# Patient Record
Sex: FEMALE | Race: WHITE | NOT HISPANIC OR LATINO | Employment: OTHER | RURAL
[De-identification: names, ages, dates, MRNs, and addresses within clinical notes are randomized per-mention and may not be internally consistent; named-entity substitution may affect disease eponyms.]

---

## 2021-01-12 ENCOUNTER — LAB (OUTPATIENT)
Dept: LAB | Facility: HOSPITAL | Age: 61
End: 2021-01-12

## 2021-01-12 ENCOUNTER — OFFICE VISIT (OUTPATIENT)
Dept: FAMILY MEDICINE CLINIC | Facility: CLINIC | Age: 61
End: 2021-01-12

## 2021-01-12 VITALS
WEIGHT: 90.9 LBS | HEART RATE: 95 BPM | HEIGHT: 66 IN | SYSTOLIC BLOOD PRESSURE: 184 MMHG | RESPIRATION RATE: 20 BRPM | BODY MASS INDEX: 14.61 KG/M2 | DIASTOLIC BLOOD PRESSURE: 100 MMHG | OXYGEN SATURATION: 98 %

## 2021-01-12 DIAGNOSIS — R64 CACHEXIA (HCC): ICD-10-CM

## 2021-01-12 DIAGNOSIS — I10 HYPERTENSION, UNSPECIFIED TYPE: Primary | ICD-10-CM

## 2021-01-12 DIAGNOSIS — G44.019 EPISODIC CLUSTER HEADACHE, NOT INTRACTABLE: ICD-10-CM

## 2021-01-12 DIAGNOSIS — G89.4 CHRONIC PAIN SYNDROME: ICD-10-CM

## 2021-01-12 PROCEDURE — 93005 ELECTROCARDIOGRAM TRACING: CPT | Performed by: STUDENT IN AN ORGANIZED HEALTH CARE EDUCATION/TRAINING PROGRAM

## 2021-01-12 PROCEDURE — 85007 BL SMEAR W/DIFF WBC COUNT: CPT | Performed by: STUDENT IN AN ORGANIZED HEALTH CARE EDUCATION/TRAINING PROGRAM

## 2021-01-12 PROCEDURE — 80053 COMPREHEN METABOLIC PANEL: CPT | Performed by: STUDENT IN AN ORGANIZED HEALTH CARE EDUCATION/TRAINING PROGRAM

## 2021-01-12 PROCEDURE — 93010 ELECTROCARDIOGRAM REPORT: CPT | Performed by: INTERNAL MEDICINE

## 2021-01-12 PROCEDURE — 84100 ASSAY OF PHOSPHORUS: CPT | Performed by: STUDENT IN AN ORGANIZED HEALTH CARE EDUCATION/TRAINING PROGRAM

## 2021-01-12 PROCEDURE — 81015 MICROSCOPIC EXAM OF URINE: CPT | Performed by: STUDENT IN AN ORGANIZED HEALTH CARE EDUCATION/TRAINING PROGRAM

## 2021-01-12 PROCEDURE — 83735 ASSAY OF MAGNESIUM: CPT | Performed by: STUDENT IN AN ORGANIZED HEALTH CARE EDUCATION/TRAINING PROGRAM

## 2021-01-12 PROCEDURE — 85025 COMPLETE CBC W/AUTO DIFF WBC: CPT | Performed by: STUDENT IN AN ORGANIZED HEALTH CARE EDUCATION/TRAINING PROGRAM

## 2021-01-12 PROCEDURE — 99204 OFFICE O/P NEW MOD 45 MIN: CPT | Performed by: STUDENT IN AN ORGANIZED HEALTH CARE EDUCATION/TRAINING PROGRAM

## 2021-01-12 RX ORDER — CYCLOBENZAPRINE HCL 10 MG
10 TABLET ORAL 3 TIMES DAILY PRN
COMMUNITY
End: 2022-07-19

## 2021-01-12 RX ORDER — MORPHINE SULFATE 30 MG/1
30 TABLET ORAL EVERY 6 HOURS PRN
COMMUNITY
End: 2021-02-11 | Stop reason: SDUPTHER

## 2021-01-12 RX ORDER — OXYCODONE AND ACETAMINOPHEN 10; 325 MG/1; MG/1
1 TABLET ORAL EVERY 6 HOURS PRN
COMMUNITY
End: 2021-02-11 | Stop reason: SDUPTHER
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
## 2021-01-13 LAB
ALBUMIN SERPL-MCNC: 4.7 G/DL (ref 3.5–5.2)
ALBUMIN/GLOB SERPL: 2.2 G/DL
ALP SERPL-CCNC: 52 U/L (ref 39–117)
ALT SERPL W P-5'-P-CCNC: 14 U/L (ref 1–33)
ANION GAP SERPL CALCULATED.3IONS-SCNC: 11.2 MMOL/L (ref 5–15)
ANISOCYTOSIS BLD QL: ABNORMAL
AST SERPL-CCNC: 21 U/L (ref 1–32)
BACTERIA UR QL AUTO: ABNORMAL /HPF
BASOPHILS # BLD MANUAL: 0.15 10*3/MM3 (ref 0–0.2)
BASOPHILS NFR BLD AUTO: 2 % (ref 0–1.5)
BILIRUB SERPL-MCNC: 0.3 MG/DL (ref 0–1.2)
BUN SERPL-MCNC: 10 MG/DL (ref 8–23)
BUN/CREAT SERPL: 20.4 (ref 7–25)
CALCIUM SPEC-SCNC: 8.8 MG/DL (ref 8.6–10.5)
CHLORIDE SERPL-SCNC: 98 MMOL/L (ref 98–107)
CO2 SERPL-SCNC: 28.8 MMOL/L (ref 22–29)
CREAT SERPL-MCNC: 0.49 MG/DL (ref 0.57–1)
DEPRECATED RDW RBC AUTO: 38.2 FL (ref 37–54)
EOSINOPHIL # BLD MANUAL: 0.08 10*3/MM3 (ref 0–0.4)
EOSINOPHIL NFR BLD MANUAL: 1 % (ref 0.3–6.2)
ERYTHROCYTE [DISTWIDTH] IN BLOOD BY AUTOMATED COUNT: 12.3 % (ref 12.3–15.4)
GFR SERPL CREATININE-BSD FRML MDRD: 128 ML/MIN/1.73
GLOBULIN UR ELPH-MCNC: 2.1 GM/DL
GLUCOSE SERPL-MCNC: 81 MG/DL (ref 65–99)
HCT VFR BLD AUTO: 40.4 % (ref 34–46.6)
HGB BLD-MCNC: 13.9 G/DL (ref 12–15.9)
HYALINE CASTS UR QL AUTO: ABNORMAL /LPF
LYMPHOCYTES # BLD MANUAL: 3.76 10*3/MM3 (ref 0.7–3.1)
LYMPHOCYTES NFR BLD MANUAL: 4 % (ref 5–12)
LYMPHOCYTES NFR BLD MANUAL: 50 % (ref 19.6–45.3)
MAGNESIUM SERPL-MCNC: 2 MG/DL (ref 1.6–2.4)
MCH RBC QN AUTO: 30.1 PG (ref 26.6–33)
MCHC RBC AUTO-ENTMCNC: 34.4 G/DL (ref 31.5–35.7)
MCV RBC AUTO: 87.4 FL (ref 79–97)
MICROCYTES BLD QL: ABNORMAL
MONOCYTES # BLD AUTO: 0.3 10*3/MM3 (ref 0.1–0.9)
NEUTROPHILS # BLD AUTO: 3.23 10*3/MM3 (ref 1.7–7)
NEUTROPHILS NFR BLD MANUAL: 43 % (ref 42.7–76)
PHOSPHATE SERPL-MCNC: 3.5 MG/DL (ref 2.5–4.5)
PLAT MORPH BLD: NORMAL
PLATELET # BLD AUTO: 272 10*3/MM3 (ref 140–450)
PMV BLD AUTO: 10.1 FL (ref 6–12)
POTASSIUM SERPL-SCNC: 3.2 MMOL/L (ref 3.5–5.2)
PROT SERPL-MCNC: 6.8 G/DL (ref 6–8.5)
RBC # BLD AUTO: 4.62 10*6/MM3 (ref 3.77–5.28)
RBC # UR: ABNORMAL /HPF
REF LAB TEST METHOD: ABNORMAL
SODIUM SERPL-SCNC: 138 MMOL/L (ref 136–145)
SQUAMOUS #/AREA URNS HPF: ABNORMAL /HPF
WBC # BLD AUTO: 7.51 10*3/MM3 (ref 3.4–10.8)
WBC MORPH BLD: NORMAL
WBC UR QL AUTO: ABNORMAL /HPF

## 2021-01-15 LAB
QT INTERVAL: 366 MS
QTC INTERVAL: 459 MS

## 2021-01-20 ENCOUNTER — TELEPHONE (OUTPATIENT)
Dept: FAMILY MEDICINE CLINIC | Facility: CLINIC | Age: 61
End: 2021-01-20

## 2021-01-20 NOTE — TELEPHONE ENCOUNTER
----- Message from Pk Barton MD sent at 1/20/2021 10:13 AM CST -----  Overall, labs are reassuring.  Nothing emergent/urgent.  Most notable abnormality was blood in the urine, we will address this at follow-up appointment. However if you start having symptoms such as burning when going to the bathroom, abdominal pain, difficulty urinating, flank pain, please inform us or go to ED.

## 2021-01-25 ENCOUNTER — TELEPHONE (OUTPATIENT)
Dept: FAMILY MEDICINE CLINIC | Facility: CLINIC | Age: 61
End: 2021-01-25

## 2021-01-25 NOTE — TELEPHONE ENCOUNTER
Left a message to remind of appointment patient needs to be here at least fifteen minuets early./hub to read

## 2021-01-27 ENCOUNTER — TELEPHONE (OUTPATIENT)
Dept: FAMILY MEDICINE CLINIC | Facility: CLINIC | Age: 61
End: 2021-01-27

## 2021-01-27 NOTE — TELEPHONE ENCOUNTER
I called Brianna Miller about referral to nutrition services because [facility] is not taking new Pt's at this time, asked if [place] would be ok? she said no that's to far. she then asked about her Morphine. She said she called the Pain mangagement center and it's going to be march before she can get in, she said she can't wait that long and wanted to know who can she go to that will give her Morphine, if she doesn't have it she will die.... notified Dr. Barton of conversation. Advised Pt I would follow up with Dr. Barton and give her a call back with info. Pt is willing to drive to [place] if there is a doctor there that will refill her Morphine Rx while waiting to get in with Pain Management.

## 2021-01-27 NOTE — TELEPHONE ENCOUNTER
Patient called you back with some more questions about the referral. Please give her a call back.

## 2021-02-08 ENCOUNTER — TELEPHONE (OUTPATIENT)
Dept: FAMILY MEDICINE CLINIC | Facility: CLINIC | Age: 61
End: 2021-02-08

## 2021-02-09 ENCOUNTER — OFFICE VISIT (OUTPATIENT)
Dept: FAMILY MEDICINE CLINIC | Facility: CLINIC | Age: 61
End: 2021-02-09

## 2021-02-09 ENCOUNTER — LAB (OUTPATIENT)
Dept: LAB | Facility: HOSPITAL | Age: 61
End: 2021-02-09

## 2021-02-09 VITALS
OXYGEN SATURATION: 97 % | BODY MASS INDEX: 15.75 KG/M2 | WEIGHT: 98 LBS | HEIGHT: 66 IN | DIASTOLIC BLOOD PRESSURE: 96 MMHG | HEART RATE: 106 BPM | SYSTOLIC BLOOD PRESSURE: 156 MMHG

## 2021-02-09 DIAGNOSIS — G89.4 CHRONIC PAIN SYNDROME: Primary | ICD-10-CM

## 2021-02-09 DIAGNOSIS — R31.21 ASYMPTOMATIC MICROSCOPIC HEMATURIA: ICD-10-CM

## 2021-02-09 DIAGNOSIS — F11.20 OPIATE DEPENDENCE, CONTINUOUS (HCC): ICD-10-CM

## 2021-02-09 PROBLEM — M25.569 KNEE PAIN: Status: ACTIVE | Noted: 2018-08-15

## 2021-02-09 PROBLEM — G43.909 MIGRAINE: Status: ACTIVE | Noted: 2017-07-19

## 2021-02-09 PROBLEM — M54.2 NECK PAIN: Status: ACTIVE | Noted: 2017-09-14

## 2021-02-09 PROBLEM — M79.641 PAIN IN RIGHT HAND: Status: ACTIVE | Noted: 2017-07-19

## 2021-02-09 PROBLEM — M79.10 MUSCLE PAIN: Status: ACTIVE | Noted: 2017-09-14

## 2021-02-09 PROBLEM — K44.9 HIATAL HERNIA: Status: ACTIVE | Noted: 2018-08-15

## 2021-02-09 PROBLEM — M54.50 LOW BACK PAIN: Status: ACTIVE | Noted: 2020-05-18

## 2021-02-09 PROBLEM — K27.9 PEPTIC ULCER: Status: ACTIVE | Noted: 2018-07-18

## 2021-02-09 PROBLEM — M54.12 CERVICAL RADICULOPATHY: Status: ACTIVE | Noted: 2020-05-18

## 2021-02-09 PROBLEM — M76.00 GLUTEAL TENDINITIS: Status: ACTIVE | Noted: 2017-07-19

## 2021-02-09 LAB
AMPHET+METHAMPHET UR QL: NEGATIVE
AMPHETAMINES UR QL: NEGATIVE
BARBITURATES UR QL SCN: NEGATIVE
BENZODIAZ UR QL SCN: NEGATIVE
BUPRENORPHINE SERPL-MCNC: NEGATIVE NG/ML
CANNABINOIDS SERPL QL: NEGATIVE
COCAINE UR QL: NEGATIVE
METHADONE UR QL SCN: NEGATIVE
OPIATES UR QL: POSITIVE
OXYCODONE UR QL SCN: POSITIVE
PCP UR QL SCN: NEGATIVE
PROPOXYPH UR QL: NEGATIVE
TRICYCLICS UR QL SCN: NEGATIVE

## 2021-02-09 PROCEDURE — 81015 MICROSCOPIC EXAM OF URINE: CPT | Performed by: STUDENT IN AN ORGANIZED HEALTH CARE EDUCATION/TRAINING PROGRAM

## 2021-02-09 PROCEDURE — 81003 URINALYSIS AUTO W/O SCOPE: CPT | Performed by: STUDENT IN AN ORGANIZED HEALTH CARE EDUCATION/TRAINING PROGRAM

## 2021-02-09 PROCEDURE — 80306 DRUG TEST PRSMV INSTRMNT: CPT | Performed by: STUDENT IN AN ORGANIZED HEALTH CARE EDUCATION/TRAINING PROGRAM

## 2021-02-09 PROCEDURE — 99214 OFFICE O/P EST MOD 30 MIN: CPT | Performed by: STUDENT IN AN ORGANIZED HEALTH CARE EDUCATION/TRAINING PROGRAM

## 2021-02-09 RX ORDER — OMEPRAZOLE 40 MG/1
CAPSULE, DELAYED RELEASE ORAL EVERY 12 HOURS SCHEDULED
COMMUNITY
End: 2022-05-06 | Stop reason: SDUPTHER

## 2021-02-09 RX ORDER — OXYCODONE HYDROCHLORIDE 10 MG/1
TABLET ORAL EVERY 6 HOURS SCHEDULED
COMMUNITY
End: 2021-02-11 | Stop reason: SDUPTHER

## 2021-02-09 NOTE — PROGRESS NOTES
"   Subjective:  Brianna Miller is a 60 y.o. female who presents for 2 week follow up    Chronic pain; states is taking Morphine 30mg x2 in morning, 30mg at night. Additionally, taking oxycodone 10mg QID as needed for breakthrough pain. Has appointment with pain management 2/19/21, but will run out of medication on the 2/12/21. She is very concerned because she does not want to withdraw. Previously seen by pain management in [place].      HTN; states not checking at home.  Blood pressure today was 156/96, states she is very anxious and in pain and feels like this is why her blood pressure is elevated.    Vitals:    Vitals:    02/09/21 1311   BP: 156/96   Pulse: 106   SpO2: 97%   Weight: 44.5 kg (98 lb)   Height: 167.6 cm (66\")     Body mass index is 15.82 kg/m².    Current Outpatient Medications:   •  cyclobenzaprine (FLEXERIL) 10 MG tablet, Take 10 mg by mouth 3 (Three) Times a Day As Needed for Muscle Spasms., Disp: , Rfl:   •  Morphine (MSIR) 30 MG tablet, Take 1 tablet by mouth Every 8 (Eight) Hours As Needed for Moderate Pain  or Severe Pain  for up to 8 days. Takes 2 tablets in the AM and 1 at night, Disp: 24 tablet, Rfl: 0  •  omeprazole (priLOSEC) 40 MG capsule, Every 12 (Twelve) Hours., Disp: , Rfl:   •  oxyCODONE (ROXICODONE) 10 MG tablet, Take 1 tablet by mouth Every 6 (Six) Hours As Needed for Moderate Pain  or Severe Pain  for up to 8 days., Disp: 32 tablet, Rfl: 0    Review of Systems  Review of Systems   Constitutional: Negative for appetite change and fever.   HENT: Negative for sore throat.    Eyes: Negative for discharge and visual disturbance.   Respiratory: Negative for cough and shortness of breath.    Cardiovascular: Negative for chest pain, palpitations and leg swelling.   Gastrointestinal: Negative for abdominal pain, diarrhea and vomiting.   Genitourinary: Negative for dysuria.   Musculoskeletal: Positive for arthralgias, back pain and myalgias.   Skin: Negative for rash.   Neurological: " Negative for light-headedness and headaches.   Psychiatric/Behavioral: Negative for agitation.       Patient Active Problem List   Diagnosis   • Cervical radiculopathy   • Gluteal tendinitis   • Hiatal hernia   • Knee pain   • Low back pain   • Migraine   • Muscle pain   • Neck pain   • Pain in right hand   • Peptic ulcer   • Anxiety state   • Fracture of hand     Past Surgical History:   Procedure Laterality Date   • BACK SURGERY     • CYST REMOVAL     • TONSILLECTOMY       Social History     Socioeconomic History   • Marital status:      Spouse name: Not on file   • Number of children: Not on file   • Years of education: Not on file   • Highest education level: Not on file   Tobacco Use   • Smoking status: Current Every Day Smoker     Packs/day: 0.25     Types: Cigarettes   • Smokeless tobacco: Never Used   Substance and Sexual Activity   • Alcohol use: Never     Frequency: Never   • Drug use: Never     History reviewed. No pertinent family history.  Office Visit on 01/12/2021   Component Date Value Ref Range Status   • QT Interval 01/12/2021 366  ms Final   • QTC Interval 01/12/2021 459  ms Final   • Glucose 01/12/2021 81  65 - 99 mg/dL Final   • BUN 01/12/2021 10  8 - 23 mg/dL Final   • Creatinine 01/12/2021 0.49* 0.57 - 1.00 mg/dL Final   • Sodium 01/12/2021 138  136 - 145 mmol/L Final   • Potassium 01/12/2021 3.2* 3.5 - 5.2 mmol/L Final   • Chloride 01/12/2021 98  98 - 107 mmol/L Final   • CO2 01/12/2021 28.8  22.0 - 29.0 mmol/L Final   • Calcium 01/12/2021 8.8  8.6 - 10.5 mg/dL Final   • Total Protein 01/12/2021 6.8  6.0 - 8.5 g/dL Final   • Albumin 01/12/2021 4.70  3.50 - 5.20 g/dL Final   • ALT (SGPT) 01/12/2021 14  1 - 33 U/L Final   • AST (SGOT) 01/12/2021 21  1 - 32 U/L Final   • Alkaline Phosphatase 01/12/2021 52  39 - 117 U/L Final   • Total Bilirubin 01/12/2021 0.3  0.0 - 1.2 mg/dL Final   • eGFR Non African Amer 01/12/2021 128  >60 mL/min/1.73 Final   • Globulin 01/12/2021 2.1  gm/dL Final    • A/G Ratio 01/12/2021 2.2  g/dL Final   • BUN/Creatinine Ratio 01/12/2021 20.4  7.0 - 25.0 Final   • Anion Gap 01/12/2021 11.2  5.0 - 15.0 mmol/L Final   • Magnesium 01/12/2021 2.0  1.6 - 2.4 mg/dL Final   • Phosphorus 01/12/2021 3.5  2.5 - 4.5 mg/dL Final   • WBC 01/12/2021 7.51  3.40 - 10.80 10*3/mm3 Final   • RBC 01/12/2021 4.62  3.77 - 5.28 10*6/mm3 Final   • Hemoglobin 01/12/2021 13.9  12.0 - 15.9 g/dL Final   • Hematocrit 01/12/2021 40.4  34.0 - 46.6 % Final   • MCV 01/12/2021 87.4  79.0 - 97.0 fL Final   • MCH 01/12/2021 30.1  26.6 - 33.0 pg Final   • MCHC 01/12/2021 34.4  31.5 - 35.7 g/dL Final   • RDW 01/12/2021 12.3  12.3 - 15.4 % Final   • RDW-SD 01/12/2021 38.2  37.0 - 54.0 fl Final   • MPV 01/12/2021 10.1  6.0 - 12.0 fL Final   • Platelets 01/12/2021 272  140 - 450 10*3/mm3 Final   • RBC, UA 01/12/2021 3-5* None Seen, 0-2 /HPF Final   • WBC, UA 01/12/2021 0-2  None Seen, 0-2 /HPF Final   • Bacteria, UA 01/12/2021 None Seen  None Seen /HPF Final   • Squamous Epithelial Cells, UA 01/12/2021 0-2  None Seen, 0-2 /HPF Final   • Hyaline Casts, UA 01/12/2021 0-2  None Seen /LPF Final   • Methodology 01/12/2021 Automated Microscopy   Final   • Neutrophil % 01/12/2021 43.0  42.7 - 76.0 % Final   • Lymphocyte % 01/12/2021 50.0* 19.6 - 45.3 % Final   • Monocyte % 01/12/2021 4.0* 5.0 - 12.0 % Final   • Eosinophil % 01/12/2021 1.0  0.3 - 6.2 % Final   • Basophil % 01/12/2021 2.0* 0.0 - 1.5 % Final   • Neutrophils Absolute 01/12/2021 3.23  1.70 - 7.00 10*3/mm3 Final   • Lymphocytes Absolute 01/12/2021 3.76* 0.70 - 3.10 10*3/mm3 Final   • Monocytes Absolute 01/12/2021 0.30  0.10 - 0.90 10*3/mm3 Final   • Eosinophils Absolute 01/12/2021 0.08  0.00 - 0.40 10*3/mm3 Final   • Basophils Absolute 01/12/2021 0.15  0.00 - 0.20 10*3/mm3 Final   • Anisocytosis 01/12/2021 Slight/1+  None Seen Final   • Microcytes 01/12/2021 Slight/1+  None Seen Final   • WBC Morphology 01/12/2021 Normal  Normal Final   • Platelet Morphology  01/12/2021 Normal  Normal Final      No image results found.    [unfilled]    There is no immunization history on file for this patient.    The following portions of the patient's history were reviewed and updated as appropriate: allergies, current medications, past family history, past medical history, past social history, past surgical history and problem list.      Physical Exam  Physical Exam  Constitutional:       Appearance: Normal appearance.   HENT:      Head: Normocephalic and atraumatic.      Right Ear: Tympanic membrane and ear canal normal.      Left Ear: Tympanic membrane and ear canal normal.   Eyes:      General:         Right eye: No discharge.         Left eye: No discharge.      Conjunctiva/sclera: Conjunctivae normal.   Neck:      Musculoskeletal: Normal range of motion.   Cardiovascular:      Rate and Rhythm: Normal rate and regular rhythm.      Pulses: Normal pulses.      Heart sounds: Normal heart sounds. No murmur.   Pulmonary:      Effort: Pulmonary effort is normal. No respiratory distress.      Breath sounds: Normal breath sounds.   Abdominal:      General: There is no distension.      Palpations: Abdomen is soft.      Tenderness: There is no abdominal tenderness.   Lymphadenopathy:      Cervical: No cervical adenopathy.   Neurological:      Mental Status: She is alert. Mental status is at baseline.   Psychiatric:         Mood and Affect: Mood normal.         Behavior: Behavior normal.         Assessment/Plan    Diagnosis Plan   1. Chronic pain syndrome  Urine Drug Screen - Urine, Clean Catch    Urinalysis without microscopic (no culture) - Urine, Clean Catch   2. Asymptomatic microscopic hematuria  Urinalysis With Microscopic - Urine, Clean Catch    Urinalysis, Microscopic Only - Urine, Clean Catch   3. Opiate dependence, continuous (CMS/[facility])        Orders Placed This Encounter   Procedures   • Urine Drug Screen - Urine, Clean Catch   • Urinalysis, Microscopic Only - Urine, Clean Catch    • Urinalysis without microscopic (no culture) - Urine, Clean Catch   • Urinalysis With Microscopic - Urine, Clean Catch     Microscopic hematuria; asymptomatic, repeat UA today reassuring.  Will obtain urine drug screen, prescribed medications until able to get into pain management for 8 days.     Blood pressure; states that she is asymptomatic, does not want to change her blood pressure regimen, will keep a blood pressure log and follow-up in 1 month.     Cachexia; BMI has increased from prior visit, patient states that she is always been thin, discussed dangers of being underweight, and possible life-threatening implications, patient without bradycardia or hypotension, reassuring. Denied food avoidance or body dysmorphia.       This document has been electronically signed by Pk Barton MD on February 19, 2021 21:58 CST

## 2021-02-10 LAB
BACTERIA UR QL AUTO: ABNORMAL /HPF
BILIRUB UR QL STRIP: NEGATIVE
CLARITY UR: CLEAR
COLOR UR: YELLOW
GLUCOSE UR STRIP-MCNC: NEGATIVE MG/DL
HGB UR QL STRIP.AUTO: NEGATIVE
HYALINE CASTS UR QL AUTO: ABNORMAL /LPF
KETONES UR QL STRIP: NEGATIVE
LEUKOCYTE ESTERASE UR QL STRIP.AUTO: NEGATIVE
NITRITE UR QL STRIP: NEGATIVE
PH UR STRIP.AUTO: 7 [PH] (ref 5–8)
PROT UR QL STRIP: NEGATIVE
RBC # UR: ABNORMAL /HPF
REF LAB TEST METHOD: ABNORMAL
SP GR UR STRIP: 1.01 (ref 1–1.03)
SQUAMOUS #/AREA URNS HPF: ABNORMAL /HPF
UROBILINOGEN UR QL STRIP: NORMAL
WBC UR QL AUTO: ABNORMAL /HPF

## 2021-02-11 RX ORDER — OXYCODONE HYDROCHLORIDE 10 MG/1
10 TABLET ORAL EVERY 6 HOURS PRN
Qty: 32 TABLET | Refills: 0 | Status: SHIPPED | OUTPATIENT
Start: 2021-02-11 | End: 2021-02-19 | Stop reason: SDUPTHER

## 2021-02-11 RX ORDER — MORPHINE SULFATE 30 MG/1
30 TABLET ORAL EVERY 8 HOURS PRN
Qty: 24 TABLET | Refills: 0 | Status: SHIPPED | OUTPATIENT
Start: 2021-02-11 | End: 2021-02-19 | Stop reason: SDUPTHER

## 2021-02-12 ENCOUNTER — TELEPHONE (OUTPATIENT)
Dept: FAMILY MEDICINE CLINIC | Facility: CLINIC | Age: 61
End: 2021-02-12

## 2021-02-12 DIAGNOSIS — F11.20 OPIATE DEPENDENCE, CONTINUOUS: ICD-10-CM

## 2021-02-12 RX ORDER — MORPHINE SULFATE 30 MG/1
30 TABLET ORAL EVERY 8 HOURS PRN
Qty: 24 TABLET | Refills: 0 | Status: CANCELLED | OUTPATIENT
Start: 2021-02-12 | End: 2021-02-20

## 2021-02-12 NOTE — TELEPHONE ENCOUNTER
.Caller: Brianna Miller    Relationship: Self    Best call back number: [phone]    Medication needed:   Requested Prescriptions     Pending Prescriptions Disp Refills   • Morphine (MSIR) 30 MG tablet 24 tablet 0     Sig: Take 1 tablet by mouth Every 8 (Eight) Hours As Needed for Moderate Pain  or Severe Pain  for up to 8 days. Takes 2 tablets in the AM and 1 at night       When do you need the refill by: 02/12/21    What details did the patient provide when requesting the medication: PATIENT IS NEEDING A REFILL. PLEASE CALL AND ADVISE.     Does the patient have less than a 3 day supply:  [x] Yes  [] No    What is the patient's preferred pharmacy: [facility] #[id number] [address] [facility] AT SEC OF [address] & [facility] - [phone] [facility] [phone] FX

## 2021-02-18 ENCOUNTER — TELEPHONE (OUTPATIENT)
Dept: FAMILY MEDICINE CLINIC | Facility: CLINIC | Age: 61
End: 2021-02-18

## 2021-02-18 NOTE — TELEPHONE ENCOUNTER
Caller: Brianna Miller    Relationship: Self    Best call back number: [phone]    What medication are you requesting: PAIN MEDICATION     Have you had these symptoms before:    [x] Yes  [] No    Have you been treated for these symptoms before:   [x] Yes  [] No    If a prescription is needed, what is your preferred pharmacy and phone number: [facility] #[id number] [place], [place] - [address] AT SEC [address] [facility] [address] - [phone]  - [phone]   [phone]       PATIENT CALLED IN STATING THAT THE PAIN CLINIC CANCELED HER APPT. PATIENT STATES THAT SHE WILL RUN OUT OF HER MEDICATION BEFORE SHE CAN GET INTO THE PAIN CLINIC AGAIN.  PATIENT IS WANTING TO KNOW IF  WOULD CALL IN A WEEKS WORTH OR SO OF MEDICATION, ENOUGH TO GET HER TO HER APPT. PLEASE ADVISE.

## 2021-02-19 DIAGNOSIS — F11.20 OPIATE DEPENDENCE, CONTINUOUS (HCC): ICD-10-CM

## 2021-02-19 PROBLEM — S62.90XA FRACTURE OF HAND: Status: ACTIVE | Noted: 2021-02-19

## 2021-02-19 PROBLEM — F41.1 ANXIETY STATE: Status: ACTIVE | Noted: 2021-02-19

## 2021-02-19 RX ORDER — MORPHINE SULFATE 30 MG/1
30 TABLET ORAL EVERY 8 HOURS PRN
Qty: 24 TABLET | Refills: 0 | Status: SHIPPED | OUTPATIENT
Start: 2021-02-19 | End: 2021-02-26 | Stop reason: SDUPTHER

## 2021-02-19 RX ORDER — OXYCODONE HYDROCHLORIDE 10 MG/1
10 TABLET ORAL EVERY 6 HOURS PRN
Qty: 32 TABLET | Refills: 0 | Status: SHIPPED | OUTPATIENT
Start: 2021-02-19 | End: 2021-02-26 | Stop reason: SDUPTHER

## 2021-02-25 ENCOUNTER — TELEPHONE (OUTPATIENT)
Dept: FAMILY MEDICINE CLINIC | Facility: CLINIC | Age: 61
End: 2021-02-25

## 2021-02-26 ENCOUNTER — TELEPHONE (OUTPATIENT)
Dept: FAMILY MEDICINE CLINIC | Facility: CLINIC | Age: 61
End: 2021-02-26

## 2021-02-26 DIAGNOSIS — F11.20 OPIATE DEPENDENCE, CONTINUOUS (HCC): ICD-10-CM

## 2021-02-26 RX ORDER — MORPHINE SULFATE 30 MG/1
30 TABLET ORAL EVERY 8 HOURS PRN
Qty: 24 TABLET | Refills: 0 | Status: SHIPPED | OUTPATIENT
Start: 2021-02-27 | End: 2021-03-05 | Stop reason: SDUPTHER

## 2021-02-26 RX ORDER — OXYCODONE HYDROCHLORIDE 10 MG/1
10 TABLET ORAL EVERY 6 HOURS PRN
Qty: 32 TABLET | Refills: 0 | Status: SHIPPED | OUTPATIENT
Start: 2021-02-27 | End: 2021-03-02 | Stop reason: SDUPTHER

## 2021-02-26 NOTE — TELEPHONE ENCOUNTER
Please give this patient a call back. She really wants to know that her pain medication was sent it.

## 2021-03-02 DIAGNOSIS — F11.20 OPIATE DEPENDENCE, CONTINUOUS (HCC): ICD-10-CM

## 2021-03-02 RX ORDER — OXYCODONE HYDROCHLORIDE 10 MG/1
10 TABLET ORAL EVERY 6 HOURS PRN
Qty: 32 TABLET | Refills: 0 | Status: SHIPPED | OUTPATIENT
Start: 2021-03-02 | End: 2021-03-22

## 2021-03-04 ENCOUNTER — TELEPHONE (OUTPATIENT)
Dept: FAMILY MEDICINE CLINIC | Facility: CLINIC | Age: 61
End: 2021-03-04

## 2021-03-04 NOTE — TELEPHONE ENCOUNTER
Ms Paul would like to know if you could refill her morphine until her can get into the pain clinic 3/12/2021 is her appointment date.

## 2021-03-05 DIAGNOSIS — F11.20 OPIATE DEPENDENCE, CONTINUOUS (HCC): ICD-10-CM

## 2021-03-05 RX ORDER — MORPHINE SULFATE 30 MG/1
30 TABLET ORAL EVERY 8 HOURS PRN
Qty: 21 TABLET | Refills: 0 | Status: SHIPPED | OUTPATIENT
Start: 2021-03-05 | End: 2021-03-12

## 2021-04-13 ENCOUNTER — OFFICE VISIT (OUTPATIENT)
Dept: FAMILY MEDICINE CLINIC | Facility: CLINIC | Age: 61
End: 2021-04-13

## 2021-04-13 ENCOUNTER — TELEPHONE (OUTPATIENT)
Dept: FAMILY MEDICINE CLINIC | Facility: CLINIC | Age: 61
End: 2021-04-13

## 2021-04-13 VITALS
OXYGEN SATURATION: 98 % | WEIGHT: 95 LBS | SYSTOLIC BLOOD PRESSURE: 120 MMHG | RESPIRATION RATE: 18 BRPM | HEIGHT: 66 IN | HEART RATE: 83 BPM | DIASTOLIC BLOOD PRESSURE: 62 MMHG | BODY MASS INDEX: 15.27 KG/M2

## 2021-04-13 DIAGNOSIS — K44.9 HIATAL HERNIA: Primary | ICD-10-CM

## 2021-04-13 DIAGNOSIS — R64 CACHEXIA (HCC): ICD-10-CM

## 2021-04-13 PROBLEM — M79.7 FIBROMYALGIA: Status: ACTIVE | Noted: 2021-03-09

## 2021-04-13 PROCEDURE — 99213 OFFICE O/P EST LOW 20 MIN: CPT | Performed by: STUDENT IN AN ORGANIZED HEALTH CARE EDUCATION/TRAINING PROGRAM

## 2021-04-13 RX ORDER — SUCRALFATE ORAL 1 G/10ML
1 SUSPENSION ORAL
Qty: 840 ML | Refills: 5 | Status: SHIPPED | OUTPATIENT
Start: 2021-04-13 | End: 2021-04-20

## 2021-04-13 RX ORDER — MORPHINE SULFATE 30 MG/1
30 TABLET, FILM COATED, EXTENDED RELEASE ORAL 2 TIMES DAILY
COMMUNITY
Start: 2021-03-16

## 2021-04-13 NOTE — PROGRESS NOTES
"   Subjective:  Brianna Miller is a 60 y.o. female who presents for ulcer    States think she has an ulcer;  Had a stomach ulcer approximately 30 years ago.  Has been taking her Prilosec and pepto every day.  Has had increased epigastric pain, medication does help, however would like to see gastroenterology.  Recent labs in January reassuring, no hematochezia, hematemesis, night sweats, unintentional weight loss.  Patient currently working on increasing nutrition, weight has increased slightly since last visit.      Patient Active Problem List   Diagnosis   • Cervical radiculopathy   • Gluteal tendinitis   • Hiatal hernia   • Knee pain   • Low back pain   • Migraine   • Muscle pain   • Neck pain   • Pain in right hand   • Peptic ulcer   • Anxiety state   • Fracture of hand   • Fibromyalgia     Vitals:    Vitals:    04/13/21 1147   BP: 120/62   Pulse: 83   Resp: 18   SpO2: 98%   Weight: 43.1 kg (95 lb)   Height: 167.6 cm (66\")     Body mass index is 15.33 kg/m².    Current Outpatient Medications:   •  cyclobenzaprine (FLEXERIL) 10 MG tablet, Take 10 mg by mouth 3 (Three) Times a Day As Needed for Muscle Spasms., Disp: , Rfl:   •  Morphine (MS CONTIN) 30 MG 12 hr tablet, Take 30 mg by mouth 2 (Two) Times a Day., Disp: , Rfl:   •  omeprazole (priLOSEC) 40 MG capsule, Every 12 (Twelve) Hours., Disp: , Rfl:   •  sucralfate (CARAFATE) 1 g tablet, TAKE 1 TABLET BY MOUTH FOUR TIMES DAILY, Disp: 360 tablet, Rfl: 0    Review of Systems  Review of Systems   Constitutional: Negative for appetite change and fever.   HENT: Negative for sore throat.    Eyes: Negative for discharge and visual disturbance.   Respiratory: Negative for cough and shortness of breath.    Cardiovascular: Negative for chest pain, palpitations and leg swelling.   Gastrointestinal: Positive for abdominal pain. Negative for diarrhea and vomiting.   Genitourinary: Negative for dysuria.   Skin: Negative for rash.   Neurological: Negative for light-headedness " and headaches.   Psychiatric/Behavioral: Negative for agitation.       Patient Active Problem List   Diagnosis   • Cervical radiculopathy   • Gluteal tendinitis   • Hiatal hernia   • Knee pain   • Low back pain   • Migraine   • Muscle pain   • Neck pain   • Pain in right hand   • Peptic ulcer   • Anxiety state   • Fracture of hand   • Fibromyalgia     Past Surgical History:   Procedure Laterality Date   • BACK SURGERY     • CYST REMOVAL     • TONSILLECTOMY       Social History     Socioeconomic History   • Marital status:      Spouse name: Not on file   • Number of children: Not on file   • Years of education: Not on file   • Highest education level: Not on file   Tobacco Use   • Smoking status: Current Every Day Smoker     Packs/day: 0.25     Types: Cigarettes   • Smokeless tobacco: Never Used   Substance and Sexual Activity   • Alcohol use: Never   • Drug use: Never     History reviewed. No pertinent family history.  Office Visit on 02/09/2021   Component Date Value Ref Range Status   • THC, Screen, Urine 02/09/2021 Negative  Negative Final   • Phencyclidine (PCP), Urine 02/09/2021 Negative  Negative Final   • Cocaine Screen, Urine 02/09/2021 Negative  Negative Final   • Methamphetamine, Ur 02/09/2021 Negative  Negative Final   • Opiate Screen 02/09/2021 Positive* Negative Final   • Amphetamine Screen, Urine 02/09/2021 Negative  Negative Final   • Benzodiazepine Screen, Urine 02/09/2021 Negative  Negative Final   • Tricyclic Antidepressants Screen 02/09/2021 Negative  Negative Final   • Methadone Screen, Urine 02/09/2021 Negative  Negative Final   • Barbiturates Screen, Urine 02/09/2021 Negative  Negative Final   • Oxycodone Screen, Urine 02/09/2021 Positive* Negative Final   • Propoxyphene Screen 02/09/2021 Negative  Negative Final   • Buprenorphine, Screen, Urine 02/09/2021 Negative  Negative Final   • RBC, UA 02/09/2021 0-2  None Seen, 0-2 /HPF Final   • WBC, UA 02/09/2021 0-2  None Seen, 0-2 /HPF Final    • Bacteria, UA 02/09/2021 None Seen  None Seen /HPF Final   • Squamous Epithelial Cells, UA 02/09/2021 3-6* None Seen, 0-2 /HPF Final   • Hyaline Casts, UA 02/09/2021 None Seen  None Seen /LPF Final   • Methodology 02/09/2021 Automated Microscopy   Final   • Color, UA 02/09/2021 Yellow  Yellow, Straw Final   • Appearance, UA 02/09/2021 Clear  Clear Final   • pH, UA 02/09/2021 7.0  5.0 - 8.0 Final   • Specific Gravity, UA 02/09/2021 1.008  1.005 - 1.030 Final   • Glucose, UA 02/09/2021 Negative  Negative Final   • Ketones, UA 02/09/2021 Negative  Negative Final   • Bilirubin, UA 02/09/2021 Negative  Negative Final   • Blood, UA 02/09/2021 Negative  Negative Final   • Protein, UA 02/09/2021 Negative  Negative Final   • Leuk Esterase, UA 02/09/2021 Negative  Negative Final   • Nitrite, UA 02/09/2021 Negative  Negative Final   • Urobilinogen, UA 02/09/2021 0.2 E.U./dL  0.2 - 1.0 E.U./dL Final   Office Visit on 01/12/2021   Component Date Value Ref Range Status   • QT Interval 01/12/2021 366  ms Final   • QTC Interval 01/12/2021 459  ms Final   • Glucose 01/12/2021 81  65 - 99 mg/dL Final   • BUN 01/12/2021 10  8 - 23 mg/dL Final   • Creatinine 01/12/2021 0.49* 0.57 - 1.00 mg/dL Final   • Sodium 01/12/2021 138  136 - 145 mmol/L Final   • Potassium 01/12/2021 3.2* 3.5 - 5.2 mmol/L Final   • Chloride 01/12/2021 98  98 - 107 mmol/L Final   • CO2 01/12/2021 28.8  22.0 - 29.0 mmol/L Final   • Calcium 01/12/2021 8.8  8.6 - 10.5 mg/dL Final   • Total Protein 01/12/2021 6.8  6.0 - 8.5 g/dL Final   • Albumin 01/12/2021 4.70  3.50 - 5.20 g/dL Final   • ALT (SGPT) 01/12/2021 14  1 - 33 U/L Final   • AST (SGOT) 01/12/2021 21  1 - 32 U/L Final   • Alkaline Phosphatase 01/12/2021 52  39 - 117 U/L Final   • Total Bilirubin 01/12/2021 0.3  0.0 - 1.2 mg/dL Final   • eGFR Non African Amer 01/12/2021 128  >60 mL/min/1.73 Final   • Globulin 01/12/2021 2.1  gm/dL Final   • A/G Ratio 01/12/2021 2.2  g/dL Final   • BUN/Creatinine Ratio  01/12/2021 20.4  7.0 - 25.0 Final   • Anion Gap 01/12/2021 11.2  5.0 - 15.0 mmol/L Final   • Magnesium 01/12/2021 2.0  1.6 - 2.4 mg/dL Final   • Phosphorus 01/12/2021 3.5  2.5 - 4.5 mg/dL Final   • WBC 01/12/2021 7.51  3.40 - 10.80 10*3/mm3 Final   • RBC 01/12/2021 4.62  3.77 - 5.28 10*6/mm3 Final   • Hemoglobin 01/12/2021 13.9  12.0 - 15.9 g/dL Final   • Hematocrit 01/12/2021 40.4  34.0 - 46.6 % Final   • MCV 01/12/2021 87.4  79.0 - 97.0 fL Final   • MCH 01/12/2021 30.1  26.6 - 33.0 pg Final   • MCHC 01/12/2021 34.4  31.5 - 35.7 g/dL Final   • RDW 01/12/2021 12.3  12.3 - 15.4 % Final   • RDW-SD 01/12/2021 38.2  37.0 - 54.0 fl Final   • MPV 01/12/2021 10.1  6.0 - 12.0 fL Final   • Platelets 01/12/2021 272  140 - 450 10*3/mm3 Final   • RBC, UA 01/12/2021 3-5* None Seen, 0-2 /HPF Final   • WBC, UA 01/12/2021 0-2  None Seen, 0-2 /HPF Final   • Bacteria, UA 01/12/2021 None Seen  None Seen /HPF Final   • Squamous Epithelial Cells, UA 01/12/2021 0-2  None Seen, 0-2 /HPF Final   • Hyaline Casts, UA 01/12/2021 0-2  None Seen /LPF Final   • Methodology 01/12/2021 Automated Microscopy   Final   • Neutrophil % 01/12/2021 43.0  42.7 - 76.0 % Final   • Lymphocyte % 01/12/2021 50.0* 19.6 - 45.3 % Final   • Monocyte % 01/12/2021 4.0* 5.0 - 12.0 % Final   • Eosinophil % 01/12/2021 1.0  0.3 - 6.2 % Final   • Basophil % 01/12/2021 2.0* 0.0 - 1.5 % Final   • Neutrophils Absolute 01/12/2021 3.23  1.70 - 7.00 10*3/mm3 Final   • Lymphocytes Absolute 01/12/2021 3.76* 0.70 - 3.10 10*3/mm3 Final   • Monocytes Absolute 01/12/2021 0.30  0.10 - 0.90 10*3/mm3 Final   • Eosinophils Absolute 01/12/2021 0.08  0.00 - 0.40 10*3/mm3 Final   • Basophils Absolute 01/12/2021 0.15  0.00 - 0.20 10*3/mm3 Final   • Anisocytosis 01/12/2021 Slight/1+  None Seen Final   • Microcytes 01/12/2021 Slight/1+  None Seen Final   • WBC Morphology 01/12/2021 Normal  Normal Final   • Platelet Morphology 01/12/2021 Normal  Normal Final      No image results  found.    @[facility]@    There is no immunization history on file for this patient.    The following portions of the patient's history were reviewed and updated as appropriate: allergies, current medications, past family history, past medical history, past social history, past surgical history and problem list.        Physical Exam  Physical Exam  Constitutional:       Appearance: Normal appearance.   HENT:      Head: Normocephalic and atraumatic.      Right Ear: Tympanic membrane and ear canal normal.      Left Ear: Tympanic membrane and ear canal normal.   Eyes:      General:         Right eye: No discharge.         Left eye: No discharge.      Conjunctiva/sclera: Conjunctivae normal.   Cardiovascular:      Rate and Rhythm: Normal rate and regular rhythm.      Pulses: Normal pulses.      Heart sounds: Normal heart sounds. No murmur heard.     Pulmonary:      Effort: Pulmonary effort is normal. No respiratory distress.      Breath sounds: Normal breath sounds.   Abdominal:      General: There is no distension.      Palpations: Abdomen is soft.      Tenderness: There is no abdominal tenderness.   Musculoskeletal:      Cervical back: Normal range of motion.   Lymphadenopathy:      Cervical: No cervical adenopathy.   Neurological:      Mental Status: She is alert. Mental status is at baseline.   Psychiatric:         Mood and Affect: Mood normal.         Behavior: Behavior normal.         Assessment/Plan    Diagnosis Plan   1. Hiatal hernia  Ambulatory Referral to Gastroenterology   2. Cachexia (CMS/HCC)        Orders Placed This Encounter   Procedures   • Ambulatory Referral to Gastroenterology     Referral Priority:   Routine     Referral Type:   Consultation     Referral Reason:   Specialty Services Required     Requested Specialty:   Gastroenterology     Number of Visits Requested:   1     Patient with history of hiatal hernia;.  Currently on sucralfate, Prilosec, no red flags, recent labs reassuring.  Encourage  patient to continue to increase nutrition, BMI still highly concerning, referred to gastroenterology for further management.        This document has been electronically signed by Pk Barton MD on April 23, 2021 15:01 CDT

## 2021-04-20 DIAGNOSIS — K44.9 HIATAL HERNIA: ICD-10-CM

## 2021-04-20 RX ORDER — SUCRALFATE 1 G/1
1 TABLET ORAL 4 TIMES DAILY
Qty: 120 TABLET | Refills: 1 | Status: SHIPPED | OUTPATIENT
Start: 2021-04-20 | End: 2021-04-21

## 2021-04-21 RX ORDER — SUCRALFATE 1 G/1
TABLET ORAL
Qty: 360 TABLET | Refills: 0 | Status: SHIPPED | OUTPATIENT
Start: 2021-04-21 | End: 2021-08-03 | Stop reason: SDUPTHER

## 2021-05-21 ENCOUNTER — TELEPHONE (OUTPATIENT)
Dept: FAMILY MEDICINE CLINIC | Facility: CLINIC | Age: 61
End: 2021-05-21

## 2021-05-28 NOTE — TELEPHONE ENCOUNTER
"Sent letter out. It came back stating \"return to sender - unable to forward\".  Cancelled referral because we cannot contact patient.  "

## 2021-08-03 ENCOUNTER — OFFICE VISIT (OUTPATIENT)
Dept: FAMILY MEDICINE CLINIC | Facility: CLINIC | Age: 61
End: 2021-08-03

## 2021-08-03 VITALS
HEIGHT: 66 IN | WEIGHT: 91.3 LBS | DIASTOLIC BLOOD PRESSURE: 100 MMHG | BODY MASS INDEX: 14.67 KG/M2 | SYSTOLIC BLOOD PRESSURE: 150 MMHG | OXYGEN SATURATION: 98 % | TEMPERATURE: 97.7 F | HEART RATE: 85 BPM

## 2021-08-03 DIAGNOSIS — J01.00 SUBACUTE MAXILLARY SINUSITIS: ICD-10-CM

## 2021-08-03 DIAGNOSIS — H60.503 ACUTE NONINFECTIVE OTITIS EXTERNA OF BOTH EARS, UNSPECIFIED TYPE: Primary | ICD-10-CM

## 2021-08-03 DIAGNOSIS — K44.9 HIATAL HERNIA: ICD-10-CM

## 2021-08-03 DIAGNOSIS — G44.019 EPISODIC CLUSTER HEADACHE, NOT INTRACTABLE: ICD-10-CM

## 2021-08-03 DIAGNOSIS — G43.009 MIGRAINE WITHOUT AURA AND WITHOUT STATUS MIGRAINOSUS, NOT INTRACTABLE: ICD-10-CM

## 2021-08-03 DIAGNOSIS — H61.22 HEARING LOSS DUE TO CERUMEN IMPACTION, LEFT: ICD-10-CM

## 2021-08-03 PROCEDURE — 99213 OFFICE O/P EST LOW 20 MIN: CPT | Performed by: STUDENT IN AN ORGANIZED HEALTH CARE EDUCATION/TRAINING PROGRAM

## 2021-08-03 RX ORDER — NEOMYCIN SULFATE, POLYMYXIN B SULFATE AND HYDROCORTISONE 10; 3.5; 1 MG/ML; MG/ML; [USP'U]/ML
3 SUSPENSION/ DROPS AURICULAR (OTIC) 4 TIMES DAILY
Qty: 10 ML | Refills: 3 | Status: SHIPPED | OUTPATIENT
Start: 2021-08-03 | End: 2021-08-03

## 2021-08-03 RX ORDER — NEOMYCIN SULFATE, POLYMYXIN B SULFATE AND HYDROCORTISONE 10; 3.5; 1 MG/ML; MG/ML; [USP'U]/ML
3 SUSPENSION/ DROPS AURICULAR (OTIC) 4 TIMES DAILY
Qty: 10 ML | Refills: 3 | Status: SHIPPED | OUTPATIENT
Start: 2021-08-03 | End: 2021-08-19

## 2021-08-03 RX ORDER — RIZATRIPTAN BENZOATE 10 MG/1
10 TABLET ORAL ONCE AS NEEDED
COMMUNITY
End: 2021-08-03 | Stop reason: SDUPTHER

## 2021-08-03 RX ORDER — AMOXICILLIN AND CLAVULANATE POTASSIUM 875; 125 MG/1; MG/1
1 TABLET, FILM COATED ORAL 2 TIMES DAILY
Qty: 14 TABLET | Refills: 0 | Status: SHIPPED | OUTPATIENT
Start: 2021-08-03 | End: 2021-08-03

## 2021-08-03 RX ORDER — SUCRALFATE 1 G/1
1 TABLET ORAL 4 TIMES DAILY
Qty: 360 TABLET | Refills: 1 | Status: SHIPPED | OUTPATIENT
Start: 2021-08-03 | End: 2021-08-03

## 2021-08-03 RX ORDER — AMOXICILLIN AND CLAVULANATE POTASSIUM 875; 125 MG/1; MG/1
1 TABLET, FILM COATED ORAL 2 TIMES DAILY
Qty: 14 TABLET | Refills: 0 | Status: SHIPPED | OUTPATIENT
Start: 2021-08-03 | End: 2021-08-19

## 2021-08-03 RX ORDER — GABAPENTIN 100 MG/1
100 CAPSULE ORAL 3 TIMES DAILY
COMMUNITY
Start: 2021-07-24 | End: 2022-04-25 | Stop reason: DRUGHIGH

## 2021-08-03 RX ORDER — SUCRALFATE 1 G/1
1 TABLET ORAL 4 TIMES DAILY
Qty: 360 TABLET | Refills: 1 | Status: SHIPPED | OUTPATIENT
Start: 2021-08-03 | End: 2022-05-06 | Stop reason: ALTCHOICE

## 2021-08-03 RX ORDER — MULTIPLE VITAMINS W/ MINERALS TAB 9MG-400MCG
1 TAB ORAL DAILY
COMMUNITY
End: 2021-08-19

## 2021-08-03 RX ORDER — RIZATRIPTAN BENZOATE 10 MG/1
10 TABLET ORAL ONCE AS NEEDED
Qty: 10 TABLET | Refills: 1 | Status: SHIPPED | OUTPATIENT
Start: 2021-08-03 | End: 2021-11-30

## 2021-08-03 RX ORDER — RIZATRIPTAN BENZOATE 10 MG/1
10 TABLET ORAL ONCE AS NEEDED
Qty: 10 TABLET | Refills: 1 | Status: SHIPPED | OUTPATIENT
Start: 2021-08-03 | End: 2021-08-03

## 2021-08-03 NOTE — PROGRESS NOTES
"Subjective:  Brianna Miller is a 61 y.o. female who presents for     States has had recurrent ear infections her whole life, has not seen ENT in several years. This recent infection started ~ 3 months ago, went to urgent care twice. First time was given abx and steroid believes it was ciprofloxacin. Went again ~ 2-3 weeks later, was given abx and prednisone. Took half of prednisone because of mood disturbances. Was on it for 3-5 days for both abx. Additionally, has had left sided lymph node swelling, tenderness. No sore throat, no trouble swallowing, breathing, fever chills, nausea, vomiting, diarrhea. No loss of taste or smell, no sick contacts, no discharge from ear, decreased hearing but has popping and fullness sensation in her left ear.     Patient Active Problem List   Diagnosis   • Cervical radiculopathy   • Gluteal tendinitis   • Hiatal hernia   • Knee pain   • Low back pain   • Migraine   • Muscle pain   • Neck pain   • Pain in right hand   • Peptic ulcer   • Anxiety state   • Fracture of hand   • Fibromyalgia     Vitals:    Vitals:    08/03/21 1630 08/03/21 1640   BP: (!) 200/100 150/100   BP Location: Right arm Right arm   Patient Position: Sitting Sitting   Cuff Size: Adult Adult   Pulse: 85    Temp: 97.7 °F (36.5 °C)    SpO2: 98%    Weight: 41.4 kg (91 lb 4.8 oz)    Height: 167.6 cm (66\")      Body mass index is 14.74 kg/m².      Current Outpatient Medications:   •  cyclobenzaprine (FLEXERIL) 10 MG tablet, Take 10 mg by mouth 3 (Three) Times a Day As Needed for Muscle Spasms., Disp: , Rfl:   •  gabapentin (NEURONTIN) 100 MG capsule, Take 100 mg by mouth 3 (Three) Times a Day., Disp: , Rfl:   •  Morphine (MS CONTIN) 30 MG 12 hr tablet, Take 30 mg by mouth 2 (Two) Times a Day., Disp: , Rfl:   •  omeprazole (priLOSEC) 40 MG capsule, Every 12 (Twelve) Hours., Disp: , Rfl:   •  rizatriptan (MAXALT) 10 MG tablet, Take 1 tablet by mouth 1 (One) Time As Needed for Migraine. May repeat in 2 hours if needed, Disp: " 10 tablet, Rfl: 1  •  sucralfate (CARAFATE) 1 g tablet, Take 1 tablet by mouth 4 (Four) Times a Day., Disp: 360 tablet, Rfl: 1    Patient Active Problem List   Diagnosis   • Cervical radiculopathy   • Gluteal tendinitis   • Hiatal hernia   • Knee pain   • Low back pain   • Migraine   • Muscle pain   • Neck pain   • Pain in right hand   • Peptic ulcer   • Anxiety state   • Fracture of hand   • Fibromyalgia     Past Surgical History:   Procedure Laterality Date   • BACK SURGERY     • CYST REMOVAL     • TONSILLECTOMY       Social History     Socioeconomic History   • Marital status:      Spouse name: Not on file   • Number of children: Not on file   • Years of education: Not on file   • Highest education level: Not on file   Tobacco Use   • Smoking status: Current Every Day Smoker     Packs/day: 0.25     Types: Cigarettes   • Smokeless tobacco: Never Used   Substance and Sexual Activity   • Alcohol use: Never   • Drug use: Never   • Sexual activity: Defer     History reviewed. No pertinent family history.  Office Visit on 02/09/2021   Component Date Value Ref Range Status   • THC, Screen, Urine 02/09/2021 Negative  Negative Final   • Phencyclidine (PCP), Urine 02/09/2021 Negative  Negative Final   • Cocaine Screen, Urine 02/09/2021 Negative  Negative Final   • Methamphetamine, Ur 02/09/2021 Negative  Negative Final   • Opiate Screen 02/09/2021 Positive* Negative Final   • Amphetamine Screen, Urine 02/09/2021 Negative  Negative Final   • Benzodiazepine Screen, Urine 02/09/2021 Negative  Negative Final   • Tricyclic Antidepressants Screen 02/09/2021 Negative  Negative Final   • Methadone Screen, Urine 02/09/2021 Negative  Negative Final   • Barbiturates Screen, Urine 02/09/2021 Negative  Negative Final   • Oxycodone Screen, Urine 02/09/2021 Positive* Negative Final   • Propoxyphene Screen 02/09/2021 Negative  Negative Final   • Buprenorphine, Screen, Urine 02/09/2021 Negative  Negative Final   • RBC, UA  02/09/2021 0-2  None Seen, 0-2 /HPF Final   • WBC, UA 02/09/2021 0-2  None Seen, 0-2 /HPF Final   • Bacteria, UA 02/09/2021 None Seen  None Seen /HPF Final   • Squamous Epithelial Cells, UA 02/09/2021 3-6* None Seen, 0-2 /HPF Final   • Hyaline Casts, UA 02/09/2021 None Seen  None Seen /LPF Final   • Methodology 02/09/2021 Automated Microscopy   Final   • Color, UA 02/09/2021 Yellow  Yellow, Straw Final   • Appearance, UA 02/09/2021 Clear  Clear Final   • pH, UA 02/09/2021 7.0  5.0 - 8.0 Final   • Specific Gravity, UA 02/09/2021 1.008  1.005 - 1.030 Final   • Glucose, UA 02/09/2021 Negative  Negative Final   • Ketones, UA 02/09/2021 Negative  Negative Final   • Bilirubin, UA 02/09/2021 Negative  Negative Final   • Blood, UA 02/09/2021 Negative  Negative Final   • Protein, UA 02/09/2021 Negative  Negative Final   • Leuk Esterase, UA 02/09/2021 Negative  Negative Final   • Nitrite, UA 02/09/2021 Negative  Negative Final   • Urobilinogen, UA 02/09/2021 0.2 E.U./dL  0.2 - 1.0 E.U./dL Final      XR Chest PA & Lateral  Narrative: Chest x-ray PA and lateral     CLINICAL INDICATION: Shortness of breath. Left-sided chest pain.    COMPARISON: None    FINDINGS: Cardiac silhouette is normal in size. Pulmonary  vascularity is unremarkable.     Lack of markings, emphysematous changes in both apices.    Flattening both diaphragms and increase in the retrosternal  airspace suggesting air trapping, COPD.    The lungs are otherwise clear.  Impression: Emphysematous changes in both apices. Flattening both  diaphragms and increase in the retrosternal airspace suggesting  air trapping, COPD. Otherwise unremarkable chest.    Electronically signed by:  Babar Che MD  8/19/2021 4:03 PM CDT  Workstation: [id number]      @[email or web address]@    There is no immunization history on file for this patient.  The following portions of the patient's history were reviewed and updated as appropriate: allergies, current medications, past family history, past  medical history, past social history, past surgical history and problem list.    PHQ-9 Total Score:           Physical Exam  Constitutional:       Appearance: Normal appearance.   HENT:      Head: Normocephalic and atraumatic.      Right Ear: External ear normal.      Left Ear: External ear normal. There is impacted cerumen.      Ears:      Comments: Erythema noted in bilateral ear canals, no bleeding or discharge.   Eyes:      General:         Right eye: No discharge.         Left eye: No discharge.      Conjunctiva/sclera: Conjunctivae normal.   Cardiovascular:      Rate and Rhythm: Normal rate and regular rhythm.      Pulses: Normal pulses.      Heart sounds: Normal heart sounds. No murmur heard.     Pulmonary:      Effort: Pulmonary effort is normal. No respiratory distress.      Breath sounds: Normal breath sounds.   Abdominal:      General: There is no distension.      Palpations: Abdomen is soft.      Tenderness: There is no abdominal tenderness.   Musculoskeletal:      Cervical back: Normal range of motion.      Right lower leg: No edema.      Left lower leg: No edema.   Lymphadenopathy:      Cervical: No cervical adenopathy.   Neurological:      Mental Status: She is alert. Mental status is at baseline.   Psychiatric:         Mood and Affect: Mood normal.         Behavior: Behavior normal.         Assessment/Plan    Diagnosis Plan   1. Acute noninfective otitis externa of both ears, unspecified type     2. Subacute maxillary sinusitis     3. Episodic cluster headache, not intractable     4. Migraine without aura and without status migrainosus, not intractable  rizatriptan (MAXALT) 10 MG tablet   5. Hiatal hernia  sucralfate (CARAFATE) 1 g tablet   6. Hearing loss due to cerumen impaction, left  Ambulatory Referral to ENT (Otolaryngology)      Orders Placed This Encounter   Procedures   • Ambulatory Referral to ENT (Otolaryngology)     Referral Priority:   Routine     Referral Type:   Consultation      Referral Reason:   Specialty Services Required     Requested Specialty:   Otolaryngology     Number of Visits Requested:   1     Otitis externa; counseled on use of eardrops, patient voiced understanding, agreeable to plan.  Will refer to ENT for further management, cerumen impaction removal of left ear.    Migraine; history of migraines, has done well on Maxalt before, needs refill, will do so.  No red flags on exam, follow-up in 1 month, sooner if needed.          This document has been electronically signed by Pk Barton MD on August 22, 2021 11:52 CDT

## 2021-08-19 ENCOUNTER — LAB (OUTPATIENT)
Dept: LAB | Facility: HOSPITAL | Age: 61
End: 2021-08-19

## 2021-08-19 ENCOUNTER — OFFICE VISIT (OUTPATIENT)
Dept: FAMILY MEDICINE CLINIC | Facility: CLINIC | Age: 61
End: 2021-08-19

## 2021-08-19 VITALS
TEMPERATURE: 99.8 F | OXYGEN SATURATION: 99 % | WEIGHT: 88.13 LBS | SYSTOLIC BLOOD PRESSURE: 130 MMHG | HEART RATE: 78 BPM | RESPIRATION RATE: 20 BRPM | DIASTOLIC BLOOD PRESSURE: 92 MMHG | BODY MASS INDEX: 14.17 KG/M2 | HEIGHT: 66 IN

## 2021-08-19 DIAGNOSIS — R03.0 ELEVATED BLOOD PRESSURE READING: ICD-10-CM

## 2021-08-19 DIAGNOSIS — Z13.29 THYROID DISORDER SCREENING: ICD-10-CM

## 2021-08-19 DIAGNOSIS — R07.9 LEFT-SIDED CHEST PAIN: ICD-10-CM

## 2021-08-19 DIAGNOSIS — R64 CACHEXIA (HCC): ICD-10-CM

## 2021-08-19 DIAGNOSIS — R07.9 LEFT-SIDED CHEST PAIN: Primary | ICD-10-CM

## 2021-08-19 DIAGNOSIS — R94.31 ABNORMAL EKG: ICD-10-CM

## 2021-08-19 PROCEDURE — 84484 ASSAY OF TROPONIN QUANT: CPT

## 2021-08-19 PROCEDURE — 80053 COMPREHEN METABOLIC PANEL: CPT

## 2021-08-19 PROCEDURE — 82550 ASSAY OF CK (CPK): CPT

## 2021-08-19 PROCEDURE — 84443 ASSAY THYROID STIM HORMONE: CPT

## 2021-08-19 PROCEDURE — 99215 OFFICE O/P EST HI 40 MIN: CPT | Performed by: NURSE PRACTITIONER

## 2021-08-19 PROCEDURE — 93005 ELECTROCARDIOGRAM TRACING: CPT | Performed by: NURSE PRACTITIONER

## 2021-08-19 PROCEDURE — 84100 ASSAY OF PHOSPHORUS: CPT

## 2021-08-19 PROCEDURE — 83735 ASSAY OF MAGNESIUM: CPT

## 2021-08-19 PROCEDURE — 85027 COMPLETE CBC AUTOMATED: CPT

## 2021-08-19 PROCEDURE — 93010 ELECTROCARDIOGRAM REPORT: CPT | Performed by: INTERNAL MEDICINE

## 2021-08-19 NOTE — PROGRESS NOTES
"Chief Complaint  chest pain x 1-2 weeks    Subjective          Brianna Miller presents to [facility]    [facility]    PCP: Dr. 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  Is in pain management and seen once monthly--currently on gabapentin, morphine, rizatriptan, cyclobenzaprine and also takes Excedrin OTC twice daily most days for headaches.  Denies abdominal pain.  Noted weight loss of 10 pounds since February, reports this has always been a problem for her.  Doesn't always feel like eating due to her pain.  Does report drinking Ensure to help supplement.  Denies any history of chest pain or known cardiac problems.  Reports she had a murmur as as child that resolved.      Treated for OM earlier in month and symptoms seems to have resolved.  Denies feeling feverish.      Has not had recent mammogram.  Denies any changes to breast.       Chest Pain   This is a new problem. Episode onset: x 2-3 weeks. The onset quality is gradual. The problem occurs intermittently. The problem has been waxing and waning. The pain is present in the lateral region (left side, breast area). The pain is mild. The quality of the pain is described as squeezing. The pain does not radiate (does notice a throbbing in left neck area at times). Associated symptoms include a cough (hx of COPD, no change from normal ), exertional chest pressure (at times), headaches (chronic) and shortness of breath (no more than normal --COPD). Pertinent negatives include no abdominal pain, back pain, claudication, diaphoresis, dizziness, fever, hemoptysis, irregular heartbeat, leg pain, lower extremity edema, malaise/fatigue, nausea, near-syncope, numbness, orthopnea, palpitations, PND, sputum production, syncope, vomiting or weakness. Nothing worsens the cough. The pain is aggravated by nothing. She has tried nothing for the symptoms. The treatment " "provided no relief. Risk factors include post-menopausal, smoking/tobacco exposure and stress (reports she lives at home and stays \"stressed\" most day).       Review of Systems   Constitutional: Positive for unexpected weight change ( down 10 pounds since Feb, but reports this is not unexpected, that her weight fluctuates frequently). Negative for diaphoresis, fever and malaise/fatigue.   Eyes: Negative.    Respiratory: Positive for cough (hx of COPD, no change from normal ) and shortness of breath (no more than normal --COPD). Negative for hemoptysis, sputum production, chest tightness and wheezing.    Cardiovascular: Positive for chest pain. Negative for palpitations, orthopnea, claudication, leg swelling, syncope, PND and near-syncope.   Gastrointestinal: Negative.  Negative for abdominal pain, nausea and vomiting.   Genitourinary: Negative.    Musculoskeletal: Negative for back pain.        Chronic generalized pain, nerve pain--sees pain management monthly     Skin:        Bruising     Neurological: Positive for headaches (chronic). Negative for dizziness, weakness and numbness.   Psychiatric/Behavioral:        Stress          Objective   Vital Signs:   /92 (BP Location: Right arm, Patient Position: Sitting)   Pulse 78   Temp 99.8 °F (37.7 °C) (Temporal)   Resp 20   Ht 167.6 cm (66\")   Wt 40 kg (88 lb 2 oz)   SpO2 99%   BMI 14.22 kg/m²       Physical Exam  Vitals and nursing note reviewed.   Constitutional:       General: She is not in acute distress.     Appearance: She is cachectic.   HENT:      Head: Normocephalic and atraumatic.      Right Ear: Tympanic membrane normal.      Left Ear: Tympanic membrane normal.      Ears:      Comments: Increased cerumen bilaterally, but not impacted  Bilateral canals are narrow       Nose: Nose normal.      Mouth/Throat:      Mouth: Mucous membranes are moist.      Pharynx: Oropharynx is clear. No oropharyngeal exudate or posterior oropharyngeal erythema. "   Eyes:      Comments: Wearing dark glasses due to reported cranial nerve damage     Neck:      Vascular: No carotid bruit.   Cardiovascular:      Rate and Rhythm: Normal rate and regular rhythm.      Comments: No peripheral edema    Pulmonary:      Effort: No respiratory distress.      Breath sounds: No wheezing, rhonchi or rales.      Comments: Diminished throughout    Abdominal:      General: Bowel sounds are normal.      Palpations: Abdomen is soft.      Tenderness: There is no abdominal tenderness. There is no right CVA tenderness, left CVA tenderness, guarding or rebound.   Musculoskeletal:      Cervical back: Neck supple. Tenderness (left neck, but no palpable LN) present.   Lymphadenopathy:      Cervical: No cervical adenopathy.   Skin:     General: Skin is warm and dry.      Findings: No rash ( no rash noted at left chest wall at area of c/o pain).   Neurological:      General: No focal deficit present.      Mental Status: She is alert and oriented to person, place, and time.   Psychiatric:         Mood and Affect: Mood normal.         Behavior: Behavior is cooperative.         Thought Content: Thought content normal.          Result Review :     Common labs    Common Labsle 1/12/21 1/12/21    1557 1557   Glucose 81    BUN 10    Creatinine 0.49 (A)    eGFR Non African Am 128    Sodium 138    Potassium 3.2 (A)    Chloride 98    Calcium 8.8    Albumin 4.70    Total Bilirubin 0.3    Alkaline Phosphatase 52    AST (SGOT) 21    ALT (SGPT) 14    WBC  7.51   Hemoglobin  13.9   Hematocrit  40.4   Platelets  272   (A) Abnormal value                   EKG: NSR, Biatrial enlargement, possible right ventricular conduction delay, septal infarct, age undetermined--official cardiology read pending     Assessment and Plan    Diagnoses and all orders for this visit:    1. Left-sided chest pain (Primary)  -     ECG 12 Lead  -     CBC No Differential; Future  -     Comprehensive metabolic panel; Future  -     Magnesium;  Future  -     Phosphorus; Future  -     Troponin; Future  -     CK; Future  -     TSH; Future  -     XR Chest PA & Lateral  -     Protime-INR  -     Ambulatory Referral to Cardiology    2. Elevated blood pressure reading  -     CBC No Differential; Future  -     Comprehensive metabolic panel; Future  -     Magnesium; Future  -     Phosphorus; Future  -     Troponin; Future  -     CK; Future  -     TSH; Future    3. Thyroid disorder screening  -     TSH; Future    4. Cachexia (CMS/HCC)    5. Abnormal EKG  -     Ambulatory Referral to Cardiology      CXR, Labs as above --will call with results when available  Hx of white coat HTN--/96 upon arrival to office, 130/92 prior to leaving without medication intervention    Agreeable to cardiology referral    Dietary needs discussed and made aware of weight loss.  Encouraged Boost or Ensure as supplements. Previously referred to nutritionist and declined appt due to no availability in [place]. Did not wish to travel to [place].     ED instructions given for worsening pain, dyspnea, n/v, diaphoresis  See PCP for routine f/u visit and management of chronic medical conditions      This document has been electronically signed by LAUREN Lucero on August 19, 2021 18:29 CDT,.            I spent 45 minutes caring for Brianna on this date of service. This time includes time spent by me in the following activities:preparing for the visit, reviewing tests, performing a medically appropriate examination and/or evaluation , counseling and educating the patient/family/caregiver, ordering medications, tests, or procedures, referring and communicating with other health care professionals  and documenting information in the medical record

## 2021-08-20 LAB
ALBUMIN SERPL-MCNC: 4.3 G/DL (ref 3.5–5.2)
ALBUMIN/GLOB SERPL: 1.7 G/DL
ALP SERPL-CCNC: 52 U/L (ref 39–117)
ALT SERPL W P-5'-P-CCNC: 8 U/L (ref 1–33)
ANION GAP SERPL CALCULATED.3IONS-SCNC: 12.3 MMOL/L (ref 5–15)
AST SERPL-CCNC: 15 U/L (ref 1–32)
BILIRUB SERPL-MCNC: 0.2 MG/DL (ref 0–1.2)
BUN SERPL-MCNC: 11 MG/DL (ref 8–23)
BUN/CREAT SERPL: 18.6 (ref 7–25)
CALCIUM SPEC-SCNC: 9 MG/DL (ref 8.6–10.5)
CHLORIDE SERPL-SCNC: 104 MMOL/L (ref 98–107)
CO2 SERPL-SCNC: 25.7 MMOL/L (ref 22–29)
CREAT SERPL-MCNC: 0.59 MG/DL (ref 0.57–1)
DEPRECATED RDW RBC AUTO: 39.6 FL (ref 37–54)
ERYTHROCYTE [DISTWIDTH] IN BLOOD BY AUTOMATED COUNT: 12 % (ref 12.3–15.4)
GFR SERPL CREATININE-BSD FRML MDRD: 103 ML/MIN/1.73
GLOBULIN UR ELPH-MCNC: 2.6 GM/DL
GLUCOSE SERPL-MCNC: 59 MG/DL (ref 65–99)
HCT VFR BLD AUTO: 40.9 % (ref 34–46.6)
HGB BLD-MCNC: 13.6 G/DL (ref 12–15.9)
MAGNESIUM SERPL-MCNC: 2 MG/DL (ref 1.6–2.4)
MCH RBC QN AUTO: 30.3 PG (ref 26.6–33)
MCHC RBC AUTO-ENTMCNC: 33.3 G/DL (ref 31.5–35.7)
MCV RBC AUTO: 91.1 FL (ref 79–97)
PLATELET # BLD AUTO: 259 10*3/MM3 (ref 140–450)
PMV BLD AUTO: 10.1 FL (ref 6–12)
POTASSIUM SERPL-SCNC: 3.5 MMOL/L (ref 3.5–5.2)
PROT SERPL-MCNC: 6.9 G/DL (ref 6–8.5)
RBC # BLD AUTO: 4.49 10*6/MM3 (ref 3.77–5.28)
SODIUM SERPL-SCNC: 142 MMOL/L (ref 136–145)
TROPONIN T SERPL-MCNC: <0.01 NG/ML (ref 0–0.03)
TSH SERPL DL<=0.05 MIU/L-ACNC: 1.09 UIU/ML (ref 0.27–4.2)
WBC # BLD AUTO: 5.9 10*3/MM3 (ref 3.4–10.8)

## 2021-08-21 LAB
CK SERPL-CCNC: 46 U/L (ref 20–180)
PHOSPHATE SERPL-MCNC: 3.8 MG/DL (ref 2.5–4.5)

## 2021-08-24 ENCOUNTER — TELEPHONE (OUTPATIENT)
Dept: FAMILY MEDICINE CLINIC | Facility: CLINIC | Age: 61
End: 2021-08-24

## 2021-08-24 LAB
QT INTERVAL: 362 MS
QTC INTERVAL: 396 MS

## 2021-08-24 NOTE — PROGRESS NOTES
Pt notified of results. Pt was unaware of emphysema- scheduled follow up with dr. Barton to discuss this.

## 2021-09-20 ENCOUNTER — TELEPHONE (OUTPATIENT)
Dept: FAMILY MEDICINE CLINIC | Facility: CLINIC | Age: 61
End: 2021-09-20

## 2021-10-05 ENCOUNTER — OFFICE VISIT (OUTPATIENT)
Dept: FAMILY MEDICINE CLINIC | Facility: CLINIC | Age: 61
End: 2021-10-05

## 2021-10-05 VITALS
SYSTOLIC BLOOD PRESSURE: 160 MMHG | BODY MASS INDEX: 14.94 KG/M2 | HEART RATE: 87 BPM | TEMPERATURE: 96.9 F | WEIGHT: 93 LBS | DIASTOLIC BLOOD PRESSURE: 108 MMHG | HEIGHT: 66 IN | OXYGEN SATURATION: 97 %

## 2021-10-05 DIAGNOSIS — Z12.31 ENCOUNTER FOR SCREENING MAMMOGRAM FOR MALIGNANT NEOPLASM OF BREAST: ICD-10-CM

## 2021-10-05 DIAGNOSIS — Z12.4 SCREENING FOR CERVICAL CANCER: ICD-10-CM

## 2021-10-05 DIAGNOSIS — B35.1 ONYCHOMYCOSIS: Primary | ICD-10-CM

## 2021-10-05 DIAGNOSIS — G62.9 PERIPHERAL POLYNEUROPATHY: ICD-10-CM

## 2021-10-05 PROCEDURE — 99214 OFFICE O/P EST MOD 30 MIN: CPT | Performed by: STUDENT IN AN ORGANIZED HEALTH CARE EDUCATION/TRAINING PROGRAM

## 2021-11-30 DIAGNOSIS — G43.009 MIGRAINE WITHOUT AURA AND WITHOUT STATUS MIGRAINOSUS, NOT INTRACTABLE: ICD-10-CM

## 2021-11-30 RX ORDER — RIZATRIPTAN BENZOATE 10 MG/1
TABLET ORAL
Qty: 10 TABLET | Refills: 1 | Status: SHIPPED | OUTPATIENT
Start: 2021-11-30 | End: 2022-07-19 | Stop reason: SINTOL

## 2021-11-30 NOTE — TELEPHONE ENCOUNTER
Rx Refill Note  Requested Prescriptions     Pending Prescriptions Disp Refills   • rizatriptan (MAXALT) 10 MG tablet [Pharmacy Med Name: RIZATRIPTAN 10MG TABLETS] 10 tablet 1     Sig: TAKE 1 TABLET BY MOUTH 1 TIME AS NEEDED FOR MIGRAINE. MAY REPEAT IN 2 HOURS AS NEEDED      Last office visit with prescribing clinician: 10/5/2021      Next office visit with prescribing clinician: 4/5/2022            Lisa Gleason Rep  11/30/21, 16:08 CST

## 2022-02-17 ENCOUNTER — TELEPHONE (OUTPATIENT)
Dept: FAMILY MEDICINE CLINIC | Facility: CLINIC | Age: 62
End: 2022-02-17

## 2022-03-22 ENCOUNTER — OFFICE VISIT (OUTPATIENT)
Dept: FAMILY MEDICINE CLINIC | Facility: CLINIC | Age: 62
End: 2022-03-22

## 2022-03-22 VITALS
OXYGEN SATURATION: 98 % | BODY MASS INDEX: 12.78 KG/M2 | TEMPERATURE: 97.5 F | SYSTOLIC BLOOD PRESSURE: 170 MMHG | HEIGHT: 66 IN | HEART RATE: 100 BPM | WEIGHT: 79.5 LBS | DIASTOLIC BLOOD PRESSURE: 100 MMHG

## 2022-03-22 DIAGNOSIS — R63.6 UNDERWEIGHT: ICD-10-CM

## 2022-03-22 DIAGNOSIS — R07.9 CHEST PAIN, UNSPECIFIED TYPE: Primary | ICD-10-CM

## 2022-03-22 DIAGNOSIS — J44.9 CHRONIC OBSTRUCTIVE PULMONARY DISEASE, UNSPECIFIED COPD TYPE: ICD-10-CM

## 2022-03-22 PROCEDURE — 99214 OFFICE O/P EST MOD 30 MIN: CPT | Performed by: STUDENT IN AN ORGANIZED HEALTH CARE EDUCATION/TRAINING PROGRAM

## 2022-03-22 PROCEDURE — 93005 ELECTROCARDIOGRAM TRACING: CPT | Performed by: STUDENT IN AN ORGANIZED HEALTH CARE EDUCATION/TRAINING PROGRAM

## 2022-03-22 PROCEDURE — 93010 ELECTROCARDIOGRAM REPORT: CPT | Performed by: INTERNAL MEDICINE

## 2022-03-22 RX ORDER — ALBUTEROL SULFATE 90 UG/1
2 AEROSOL, METERED RESPIRATORY (INHALATION) EVERY 4 HOURS PRN
Qty: 18 G | Refills: 3 | Status: SHIPPED | OUTPATIENT
Start: 2022-03-22

## 2022-03-22 RX ORDER — GABAPENTIN 300 MG/1
300 CAPSULE ORAL 3 TIMES DAILY
COMMUNITY
Start: 2022-03-02 | End: 2022-07-19 | Stop reason: SDUPTHER

## 2022-03-25 LAB
QT INTERVAL: 350 MS
QTC INTERVAL: 418 MS

## 2022-04-22 ENCOUNTER — TELEPHONE (OUTPATIENT)
Dept: FAMILY MEDICINE CLINIC | Facility: CLINIC | Age: 62
End: 2022-04-22

## 2022-04-27 ENCOUNTER — APPOINTMENT (OUTPATIENT)
Dept: URBAN - METROPOLITAN AREA CLINIC 265 | Age: 62
Setting detail: DERMATOLOGY
End: 2022-04-27

## 2022-04-27 VITALS — WEIGHT: 85 LBS | HEIGHT: 65 IN | RESPIRATION RATE: 18 BRPM

## 2022-04-27 DIAGNOSIS — T07XXXA ABRASION OR FRICTION BURN OF OTHER, MULTIPLE, AND UNSPECIFIED SITES, WITHOUT MENTION OF INFECTION: ICD-10-CM

## 2022-04-27 PROBLEM — S40.912A UNSPECIFIED SUPERFICIAL INJURY OF LEFT SHOULDER, INITIAL ENCOUNTER: Status: ACTIVE | Noted: 2022-04-27

## 2022-04-27 PROCEDURE — OTHER PRESCRIPTION: OTHER

## 2022-04-27 PROCEDURE — 99202 OFFICE O/P NEW SF 15 MIN: CPT

## 2022-04-27 PROCEDURE — OTHER PRESCRIPTION MEDICATION MANAGEMENT: OTHER

## 2022-04-27 PROCEDURE — OTHER COUNSELING: OTHER

## 2022-04-27 PROCEDURE — OTHER MIPS QUALITY: OTHER

## 2022-04-27 RX ORDER — MUPIROCIN 20 MG/G
OINTMENT TOPICAL
Qty: 22 | Refills: 0 | Status: ERX | COMMUNITY
Start: 2022-04-27

## 2022-04-27 ASSESSMENT — LOCATION SIMPLE DESCRIPTION DERM: LOCATION SIMPLE: LEFT SHOULDER

## 2022-04-27 ASSESSMENT — LOCATION DETAILED DESCRIPTION DERM: LOCATION DETAILED: LEFT ANTERIOR SHOULDER

## 2022-04-27 ASSESSMENT — LOCATION ZONE DERM: LOCATION ZONE: ARM

## 2022-04-27 NOTE — PROCEDURE: PRESCRIPTION MEDICATION MANAGEMENT
Initiate Treatment: mupirocin, apply bid to affected area
Render In Strict Bullet Format?: No
Plan: RTC 3 weeks\\n\\nadvised patient to treat lesion in right ear the same way. will reevaluate at f/u
Detail Level: Simple

## 2022-04-28 ENCOUNTER — TELEPHONE (OUTPATIENT)
Dept: FAMILY MEDICINE CLINIC | Facility: CLINIC | Age: 62
End: 2022-04-28

## 2022-04-28 NOTE — TELEPHONE ENCOUNTER
Left message to remind of appointment and to reschedule if necessary according to covid screening.

## 2022-05-06 ENCOUNTER — OFFICE VISIT (OUTPATIENT)
Dept: FAMILY MEDICINE CLINIC | Facility: CLINIC | Age: 62
End: 2022-05-06

## 2022-05-06 VITALS
HEIGHT: 66 IN | BODY MASS INDEX: 14.08 KG/M2 | DIASTOLIC BLOOD PRESSURE: 100 MMHG | WEIGHT: 87.6 LBS | HEART RATE: 76 BPM | SYSTOLIC BLOOD PRESSURE: 210 MMHG | OXYGEN SATURATION: 99 % | TEMPERATURE: 98.7 F

## 2022-05-06 DIAGNOSIS — Z87.11 HISTORY OF STOMACH ULCERS: ICD-10-CM

## 2022-05-06 DIAGNOSIS — I34.0 MITRAL VALVE INSUFFICIENCY, UNSPECIFIED ETIOLOGY: ICD-10-CM

## 2022-05-06 DIAGNOSIS — I10 HYPERTENSION, UNSPECIFIED TYPE: ICD-10-CM

## 2022-05-06 DIAGNOSIS — I07.1 TRICUSPID VALVE INSUFFICIENCY, UNSPECIFIED ETIOLOGY: ICD-10-CM

## 2022-05-06 DIAGNOSIS — R01.1 HEART MURMUR: ICD-10-CM

## 2022-05-06 DIAGNOSIS — R10.13 EPIGASTRIC PAIN: Primary | ICD-10-CM

## 2022-05-06 PROCEDURE — 99215 OFFICE O/P EST HI 40 MIN: CPT | Performed by: NURSE PRACTITIONER

## 2022-05-06 RX ORDER — SUCRALFATE ORAL 1 G/10ML
1 SUSPENSION ORAL
Qty: 1200 ML | Refills: 1 | Status: SHIPPED | OUTPATIENT
Start: 2022-05-06 | End: 2022-06-05

## 2022-05-06 RX ORDER — OMEPRAZOLE 40 MG/1
40 CAPSULE, DELAYED RELEASE ORAL DAILY
Qty: 30 CAPSULE | Refills: 1 | Status: SHIPPED | OUTPATIENT
Start: 2022-05-06

## 2022-05-06 RX ORDER — DILTIAZEM HYDROCHLORIDE 60 MG/1
60 CAPSULE, EXTENDED RELEASE ORAL 2 TIMES DAILY
Qty: 60 CAPSULE | Refills: 1 | Status: SHIPPED | OUTPATIENT
Start: 2022-05-06 | End: 2022-06-13

## 2022-05-06 NOTE — PROGRESS NOTES
"Chief Complaint  Abdominal Pain (Mid stomach ulcer worse last 2 months)    Subjective          Brianna Miller presents to [facility] - [facility] Day[facility]     PCP: Dr. Barton     CC: \"bp high; abdominal pain; heart problems\"    Presents with multiple complaints.  Poor historian, daughter on phone provides some history.  Recent ER visit to [facility] on 4/15 for chest pain.  Daughter reports that she received IVF's, magnesium and nitro.  Canceled f/u appointments with PCP on 4-25 and 4-29.  Partial records are received and reviewed from ER visit.  Elevated d-dimer (1863), subsequent CT chest showed no PE.  CT did showed chronic changes with no acute cardiopulmonary process; satisfactory enhancement of pulmonary arteries; dilatio nof the main pulmonary artery as compared to aorta, possible pulmonary arterial hypertension.  Troponin was elevated 27.8.  Echo performed and showed overall LVEF range of 55%.  Diminished LV compliance with trace MR, trace TR.  EKG results not noted.  BP at time of ER visit not noted, but patient reports it was elevated and she received meds while in ER.  Last documented readings in chart are 170/100 and 160/108.  Denies being on oral meds for HTN control.  Previously referred to cardiology, but was unable to be reached to schedule appointment. C/O intermittent left sided chest pain.  Has intermittent dyspnea.  Does report she is trying to cut down on cigarettes and down to about 4 cigs/day, was 1 ppd a year ago, and at max 2 ppd. Using patches which help some.  Agreeable to cardiology f/u and will place new referral today.      Also c/o epigastric pain.  Reports hx of stomach ulcers years ago and feels like she may have one again. Reports EGD years ago, unsure where it was performed.  Currently on Carafate, but doesn't feel it is helping, taking tablets, usually TID, occasionally QID.  Previously on Omeprazole, not currently taking, " "unsure why she stopped.  Chronically underweight, but denies any new weight loss.  Last weight recorded on 3-22 ws 79 pounds, but she believes this was a mistake and was 89 pounds.  Denies vomiting. No difficulty with BM's or melena has been noted.  Agreeable to Gastro referral.     Chronic headaches and pain and sees pain management and neurology.  Currently on Gabapentin, Flexeril, Maxalt and MS Contin.            Review of Systems   Constitutional: Negative for appetite change, fatigue, fever and unexpected weight change ( chronically underweight, but denies changes).   Eyes: Negative.    Respiratory: Positive for cough, chest tightness, shortness of breath (no change from normal--using inhalers as needed--COPD) and wheezing.    Cardiovascular: Positive for chest pain (occasional left sided chest pain) and leg swelling (occasional). Negative for palpitations.   Gastrointestinal: Positive for abdominal pain (epigastric). Negative for blood in stool, constipation, diarrhea, nausea and vomiting.   Genitourinary: Negative.    Skin: Negative.    Neurological: Positive for headaches (chronic).        Objective   Vital Signs:   BP (!) 210/100 (BP Location: Right arm, Patient Position: Sitting)   Pulse 76   Temp 98.7 °F (37.1 °C)   Ht 167.6 cm (66\")   Wt 39.7 kg (87 lb 9.6 oz)   SpO2 99%   BMI 14.14 kg/m²       Physical Exam  Vitals and nursing note reviewed.   Constitutional:       General: She is not in acute distress.     Appearance: Normal appearance. She is ill-appearing ( chronically).   HENT:      Head: Normocephalic and atraumatic.   Cardiovascular:      Rate and Rhythm: Normal rate and regular rhythm.      Heart sounds: Murmur heard.   Pulmonary:      Effort: Pulmonary effort is normal. No respiratory distress.      Breath sounds: No wheezing, rhonchi or rales.      Comments: Diminished, but clear    Abdominal:      General: Bowel sounds are normal.      Palpations: Abdomen is soft.      Tenderness: There " is abdominal tenderness ( epigastric). There is no right CVA tenderness, left CVA tenderness, guarding or rebound.   Musculoskeletal:      Cervical back: Neck supple.   Skin:     General: Skin is warm and dry.   Neurological:      General: No focal deficit present.      Mental Status: She is alert and oriented to person, place, and time.      Coordination: Coordination is intact.   Psychiatric:         Mood and Affect: Mood normal.         Speech: Speech normal.         Behavior: Behavior is cooperative.         Thought Content: Thought content normal.         Cognition and Memory: Cognition is impaired. Memory is impaired.      Comments: Poor historian--difficulty remembering specifics about health          Result Review :     Common labs    Common Labsle 8/19/21 8/19/21    1514 1514   Glucose  59 (A)   BUN  11   Creatinine  0.59   eGFR Non African Am  103   Sodium  142   Potassium  3.5   Chloride  104   Calcium  9.0   Albumin  4.30   Total Bilirubin  0.2   Alkaline Phosphatase  52   AST (SGOT)  15   ALT (SGPT)  8   WBC 5.90    Hemoglobin 13.6    Hematocrit 40.9    Platelets 259    (A) Abnormal value            Data reviewed: recent ER labs/imaging studies dated 4-15-22          Assessment and Plan    Diagnoses and all orders for this visit:    1. Epigastric pain (Primary)  -     sucralfate (Carafate) 1 GM/10ML suspension; Take 10 mL by mouth 4 (Four) Times a Day With Meals & at Bedtime for 30 days.  Dispense: 1200 mL; Refill: 1  -     omeprazole (priLOSEC) 40 MG capsule; Take 1 capsule by mouth Daily.  Dispense: 30 capsule; Refill: 1  -     CBC No Differential; Future  -     Comprehensive metabolic panel; Future  -     Amylase; Future  -     Lipase; Future  -     H.pylori,IgG / IgA Antibodies; Future  -     Ambulatory Referral to Gastroenterology    2. History of stomach ulcers  -     sucralfate (Carafate) 1 GM/10ML suspension; Take 10 mL by mouth 4 (Four) Times a Day With Meals & at Bedtime for 30 days.   Dispense: 1200 mL; Refill: 1  -     omeprazole (priLOSEC) 40 MG capsule; Take 1 capsule by mouth Daily.  Dispense: 30 capsule; Refill: 1  -     Ambulatory Referral to Gastroenterology    3. Heart murmur  -     Ambulatory Referral to Cardiology    4. Mitral valve insufficiency, unspecified etiology  -     Ambulatory Referral to Cardiology    5. Hypertension, unspecified type  -     Ambulatory Referral to Cardiology  -     dilTIAZem SR (CARDIZEM SR) 60 MG 12 hr capsule; Take 1 capsule by mouth 2 (Two) Times a Day.  Dispense: 60 capsule; Refill: 1    6. Tricuspid valve insufficiency, unspecified etiology  -     Ambulatory Referral to Cardiology      Referrals to cardiology and gastro placed today--    Labs as above, patient left in a hurry due to  needing to leave--did not obtain today    Recommended changing to carafate suspension QID--new Rx sent.  Restart Omeprazole daily--Rx sent.   Avoid all NSAIDs.  Limit smoking (already working on cessation).     Agreeable to starting BP meds, Rx for diltiazem SR 60 mg BID for now until f/u with cardiology or PCP  ER if chest pain returns, severe headache, dizziness, blurred vision    See PCP for routine f/u visit and management of chronic medical conditions      This document has been electronically signed by LAUREN Lucero on May 6, 2022 19:16 CDT,.    I spent 51 minutes caring for Brianna on this date of service. This time includes time spent by me in the following activities:preparing for the visit, reviewing tests, obtaining and/or reviewing a separately obtained history, performing a medically appropriate examination and/or evaluation , counseling and educating the patient/family/caregiver, ordering medications, tests, or procedures, referring and communicating with other health care professionals  and documenting information in the medical record

## 2022-06-13 ENCOUNTER — TELEPHONE (OUTPATIENT)
Dept: FAMILY MEDICINE CLINIC | Facility: CLINIC | Age: 62
End: 2022-06-13

## 2022-06-13 DIAGNOSIS — I10 HYPERTENSION, UNSPECIFIED TYPE: Primary | ICD-10-CM

## 2022-06-13 RX ORDER — VERAPAMIL HYDROCHLORIDE 80 MG/1
80 TABLET ORAL 3 TIMES DAILY
Qty: 90 TABLET | Refills: 0 | Status: SHIPPED | OUTPATIENT
Start: 2022-06-13 | End: 2022-07-14

## 2022-06-22 ENCOUNTER — TELEPHONE (OUTPATIENT)
Dept: FAMILY MEDICINE CLINIC | Facility: CLINIC | Age: 62
End: 2022-06-22

## 2022-07-14 DIAGNOSIS — I10 HYPERTENSION, UNSPECIFIED TYPE: ICD-10-CM

## 2022-07-14 RX ORDER — VERAPAMIL HYDROCHLORIDE 80 MG/1
TABLET ORAL
Qty: 90 TABLET | Refills: 0 | Status: SHIPPED | OUTPATIENT
Start: 2022-07-14 | End: 2022-07-19 | Stop reason: SINTOL

## 2022-07-14 NOTE — TELEPHONE ENCOUNTER
Rx Refill Note  Requested Prescriptions     Pending Prescriptions Disp Refills   • verapamil (CALAN) 80 MG tablet [Pharmacy Med Name: VERAPAMIL 80MG TABLETS] 90 tablet 0     Sig: TAKE 1 TABLET BY MOUTH THREE TIMES DAILY      Last office visit with prescribing clinician: 3/22/2022      Next office visit with prescribing clinician: 7/19/22           Lisa Gleason Rep  07/14/22, 08:39 CDT

## 2022-07-19 ENCOUNTER — OFFICE VISIT (OUTPATIENT)
Dept: FAMILY MEDICINE CLINIC | Facility: CLINIC | Age: 62
End: 2022-07-19

## 2022-07-19 VITALS
WEIGHT: 93 LBS | TEMPERATURE: 97.8 F | BODY MASS INDEX: 14.94 KG/M2 | SYSTOLIC BLOOD PRESSURE: 146 MMHG | HEART RATE: 88 BPM | DIASTOLIC BLOOD PRESSURE: 88 MMHG | HEIGHT: 66 IN | OXYGEN SATURATION: 98 %

## 2022-07-19 DIAGNOSIS — Z12.31 ENCOUNTER FOR SCREENING MAMMOGRAM FOR MALIGNANT NEOPLASM OF BREAST: ICD-10-CM

## 2022-07-19 DIAGNOSIS — E87.6 HYPOKALEMIA: ICD-10-CM

## 2022-07-19 DIAGNOSIS — M54.12 CERVICAL RADICULOPATHY: ICD-10-CM

## 2022-07-19 DIAGNOSIS — R44.1 VISUAL HALLUCINATIONS: ICD-10-CM

## 2022-07-19 DIAGNOSIS — Z12.11 SCREEN FOR COLON CANCER: ICD-10-CM

## 2022-07-19 DIAGNOSIS — F41.9 ANXIETY: Primary | ICD-10-CM

## 2022-07-19 PROBLEM — K21.9 GASTRO-ESOPHAGEAL REFLUX DISEASE WITHOUT ESOPHAGITIS: Status: ACTIVE | Noted: 2022-04-26

## 2022-07-19 PROBLEM — R03.0 FINDING OF ABOVE NORMAL BLOOD PRESSURE: Status: ACTIVE | Noted: 2022-07-19

## 2022-07-19 PROCEDURE — 99214 OFFICE O/P EST MOD 30 MIN: CPT | Performed by: STUDENT IN AN ORGANIZED HEALTH CARE EDUCATION/TRAINING PROGRAM

## 2022-07-19 RX ORDER — SUCRALFATE 1 G/1
1 TABLET ORAL 4 TIMES DAILY
COMMUNITY
Start: 2022-07-08 | End: 2022-08-08

## 2022-07-19 RX ORDER — GABAPENTIN 300 MG/1
300 CAPSULE ORAL 3 TIMES DAILY
Qty: 90 CAPSULE | Refills: 2 | Status: SHIPPED | OUTPATIENT
Start: 2022-07-19 | End: 2022-10-12

## 2022-07-19 NOTE — PROGRESS NOTES
"Subjective:  Brianna Miller is a 62 y.o. female who presents for     Recurrent visual hallucinations; states has had recurrent visual hallucinations since February. Has seen people, animals that were not there. Was hospitalized in February and June of this year. Both times at [facility]. Daughter states that the cause of hallucinations were not found. Does report multiple testing including lumbar puncture, CT scan and labs that were reportedly negative. Daughter reports that she had a prolonged episode, met people that were not there and had significant memory loss during the episode.     Patient Active Problem List   Diagnosis   • Cervical radiculopathy   • Gluteal tendinitis   • Hiatal hernia   • Knee pain   • Low back pain   • Migraine   • Muscle pain   • Neck pain   • Pain in right hand   • Peptic ulcer   • Anxiety state   • Fracture of hand   • Fibromyalgia   • Body mass index (BMI) 19.9 or less, adult   • Finding of above normal blood pressure   • Gastro-esophageal reflux disease without esophagitis     Vitals:    Vitals:    07/19/22 1515   BP: 146/88   BP Location: Left arm   Patient Position: Sitting   Cuff Size: Adult   Pulse: 88   Temp: 97.8 °F (36.6 °C)   SpO2: 98%   Weight: 42.2 kg (93 lb)   Height: 167.6 cm (66\")     Body mass index is 15.01 kg/m².      Current Outpatient Medications:   •  albuterol sulfate  (90 Base) MCG/ACT inhaler, Inhale 2 puffs Every 4 (Four) Hours As Needed for Wheezing., Disp: 18 g, Rfl: 3  •  aspirin-acetaminophen-caffeine (Excedrin Migraine) 250-250-65 MG per tablet, Take 1 tablet by mouth Daily., Disp: , Rfl:   •  gabapentin (NEURONTIN) 300 MG capsule, Take 1 capsule by mouth 3 (Three) Times a Day., Disp: 90 capsule, Rfl: 2  •  Morphine (MS CONTIN) 30 MG 12 hr tablet, Take 30 mg by mouth 2 (Two) Times a Day., Disp: , Rfl:   •  omeprazole (priLOSEC) 40 MG capsule, Take 1 capsule by mouth Daily., Disp: 30 capsule, Rfl: 1  •  sucralfate (CARAFATE) 1 g tablet, Take 1 g by " mouth 4 (Four) Times a Day., Disp: , Rfl:   •  Budeson-Glycopyrrol-Formoterol (BREZTRI) 160-9-4.8 MCG/ACT aerosol inhaler, Inhale 2 puffs 2 (Two) Times a Day., Disp: 10.7 g, Rfl: 1    Patient Active Problem List   Diagnosis   • Cervical radiculopathy   • Gluteal tendinitis   • Hiatal hernia   • Knee pain   • Low back pain   • Migraine   • Muscle pain   • Neck pain   • Pain in right hand   • Peptic ulcer   • Anxiety state   • Fracture of hand   • Fibromyalgia   • Body mass index (BMI) 19.9 or less, adult   • Finding of above normal blood pressure   • Gastro-esophageal reflux disease without esophagitis     Past Surgical History:   Procedure Laterality Date   • BACK SURGERY     • CYST REMOVAL     • TONSILLECTOMY       Social History     Socioeconomic History   • Marital status:    Tobacco Use   • Smoking status: Current Every Day Smoker     Packs/day: 0.50     Types: Cigarettes   • Smokeless tobacco: Never Used   Vaping Use   • Vaping Use: Never used   Substance and Sexual Activity   • Alcohol use: Never   • Drug use: Never   • Sexual activity: Defer     History reviewed. No pertinent family history.  Office Visit on 03/22/2022   Component Date Value Ref Range Status   • QT Interval 03/22/2022 350  ms Final   • QTC Interval 03/22/2022 418  ms Final      XR HIP LEFT MIN 2-3 VIEWS  PROCEDURE: XR HIP LEFT MIN 2-3 VIEWS    COMPARISON: None.    INDICATIONS: Complaining of left hip pain without injury.    Number of Images: 2    FINDINGS:   There is no fracture or dislocation.  Some minimal acetabular osteophyte formation is seen as well some calcification at the greater trochanters.  SI joints are symmetric.  No osseous erosions.    CONCLUSION:   1. Only mild degenerative change.    Dictated by: Eliseo Mcguire on 9/15/2016 at 12:31       Approved by: Eliseo Mcguire on 9/15/2016 at 12:35          US VENOUS DUPLEX LEFT LOWER EXTREMITY  PROCEDURE: US VENOUS DUPLEX LEFT LOWER EXTREMITY    COMPARISON:  None.    INDICATIONS: Left calf pain and toe numbness. No history of DVTs. Taking Fioricet for headaches.    TECHNIQUE: Color duplex Doppler ultrasound evaluation and analysis was performed in the usual manner.      FINDINGS:    Common Femoral v: Normal flow, phasicity, and compressibility.    Femoral vein: Normal flow, phasicity, and compressibility.    Popliteal vein: Normal flow, phasicity, and compressibility.    Proximal calf veins: Normal flow, phasicity, and compressibility.    Edema: There is no significant subcutaneous edema.    Other: None.      CONCLUSION: Normal examination.                Signed by: Beck Gurrola on 3/16/2018 at 14:12          CT CHEST LUNG SCREENING  PROCEDURE: CT CHEST LUNG SCREENING    COMPARISON: None.    REASON FOR SCREENING: Patient is between 55-80 years of age, asymptomatic, and has a smoking history of more than 30 pack years.  SMOKING STATUS: Current smoker.  0   Smoking pack years: 40  TECHNIQUE: Axial unenhanced LDCT images from the apices through mid-kidney were obtained using standard low radiation dose technique.   Imaging was performed on a GERS CT scanner.  TECHNICAL PARAMETERS: kVp: 120   mA: 50  RADIATION:  Dose Length Product (DLP): 70.96 mGy-cm   CT Dose Index Vol (CTDIvol): 2.03 mGy    FINDINGS:   LUNGS: There are 10 variable sized pulmonary nodules in the right lung ranging in size from 2-4 mm (series 2, images 44, 50, 54, 60, 61, 66, 81, 82, and 211).  There are four 2 mm nodules in the left upper lobe (series 2, images 43, 50, 59, and 83).  There are mild emphysematous changes bilaterally.  No endobronchial lesions are identified.  VASCULATURE: There is mild atherosclerosis in the thoracic aorta.  MARLA: No mass or adenopathy.    MEDIASTINUM: No mass or adenopathy.    CARDIAC: No enlargement, pericardial thickening, or significant calcification.  PLEURA: No mass or effusion.    CHEST WALL: No mass or axillary adenopathy.    LIMITED ABDOMEN: Limited images of the  upper abdomen are unremarkable.    BONES: There is mild right convex mid thoracic scoliosis.  OTHER: None.      CONCLUSION:   1. Fourteen pulmonary nodules ranging in size from 2-4 mm.  Recommend continuing annual screening in with low-dose CT screening exam in 12 months.    LUNG-RADS CLASSIFICATION:  Lung-RADS 2 - Benign. Nodule(s) with a very low likelihood of becoming a clinically active cancer due to size or lack of growth.  Recommendation: Continue annual screening with LDCT in 12 months.  <1% probability of malignancy.  Estimated population prevalence is 90%.  Reference: ACR Lung-RADS (Lung CT Screening reporting and data system) Version 1.0 assessment categories release date April 28, 2014.                Signed by: Gareth Encinas on 3/19/2018 at 17:38          XR RIBS LEFT W PA CHEST  PROCEDURE: XR RIBS LEFT W PA CHEST    COMPARISON: None.    INDICATIONS: Left rib pain, cough, evaluate for infiltrate  Number of Images: 5    FINDINGS:   Chest film shows normal heart size.  No consolidation, effusions, pneumothorax.  Mild scoliosis is seen throughout the thoracic spine.  Rib views demonstrate no discrete fracture.    CONCLUSION:   1. No focal infiltrates or fracture.              Signed by: Eliseo Mcguire on 4/12/2018 at 11:47          CT CHEST W IV CONTRAST  PROCEDURE: CT CHEST WITH IV CONTRAST    COMPARISON: 3/19/2018.    INDICATIONS: Left chest pain.  Known pulmonary nodules.  10 on the right, 4 on the left.    TECHNIQUE: After obtaining the patients consent, CT images were obtained with non-ionic intravenous contrast material.      RADIATION: Total Dose Length Product (DLP): 97 mGy-cm    CT Dose Index Volume (CTDIvol): 2.31 mGy  CONTRAST: Omnipaque 350, 70 mL administered I.V.    FINDINGS:   Multiple tiny lung nodules are again noted and are unchanged since the earlier study.  There is mild irregularity along the posterior inferior aspect of both main stem bronchi which appear nonspecific but could reflect  retained mucus.  Mucosa lesions would be difficult to exclude.  No focal areas of consolidation or other signs of acute pulmonary or pleural disease.  No significant pleural or mediastinal abnormality.  Calcific atherosclerotic plaque is noted.  The scanned portions of the upper abdomen show biliary dilation mostly involving the common duct but also there is mild intrahepatic dilation.  The common duct measures approximately 1 cm in the maddy hepatis region.  If further evaluation is desired, CT scanning of the abdomen or MRCP may be helpful.  Clinical correlation recommended.    CONCLUSION: No acute finding within the chest.  Stable tiny lung nodules.  Unexplained dilation of the biliary tree.  See above.              Signed by: Bernardo Paulino on 6/07/2018 at 12:39          MRI C SPINE WO CONTRAST  PROCEDURE: MRI C SPINE WO CONTRAST    COMPARISON: None.    INDICATIONS: chronic right-sided neck pain and right shoulder pain.    TECHNIQUE: Multiplanar multisequence magnetic resonance imaging of the cervical spine was performed without the use of intravenous gadolinium.    MAGNET: 3 Cat magnet    FINDINGS:   Cervical vertebrae height is maintained.  The cervical cord appears normal in signal intensity.  There is multilevel intervertebral disc desiccation with mild-to-moderate loss of disc height at the C3-C4 through C6-C7 levels, consistent with degenerative disc disease.  The craniocervical junction is intact.  There is mild prominence of the nasopharyngeal mucosa, with small cystic foci noted, possibly Thornwaldt cysts.  Correlate with clinical exam of the nasopharynx.    C2-C3:  There is no significant central canal or neural foraminal compromise.    C3-C4:  There is mild left neural foraminal narrowing.  The right neural foramen is patent.  There is a small disc bulge.  There is moderate left facet arthropathy.    C4-C5:  There is mild bilateral neural foraminal narrowing, left worse than right.  There is a  diffuse disc bulge with central disc protrusion.  There is severe left facet arthropathy.  There is mild central canal stenosis.    C5-C6:  There is mild bilateral neural foraminal narrowing, left worse than right.  There is moderate left facet arthropathy.  There is a right eccentric disc protrusion.  There is mild central canal stenosis.    C6-C7:  There is moderate to severe left and moderate right neural foraminal stenosis.  There is a diffuse disc bulge, which is eccentric to the left.  The central canal appears patent.    C7-T1:  There is no significant central canal or neural foraminal compromise.    CONCLUSION:   1. Moderate right and severe left C6-C7 neural foraminal narrowing.  2. Mild central canal stenosis at the C4-C5 and C5-C6 levels.  3. Nonspecific prominence of the nasopharyngeal mucosa.  Advise correlation with clinical exam.              Signed by: Jeramy Esposito on 9/30/2019 at 18:32          MRI BRAIN WO CONTRAST  PROCEDURE: MRI BRAIN WO CONTRAST    COMPARISON: None.    INDICATIONS: chronic constant right-sided headaches and right sided eye pain with drooping and light sensitivity.    TECHNIQUE: Multiplanar multisequence magnetic resonance imaging of the brain was performed without intravenous gadolinium injection.  MAGNET: 3 Cat magnet    FINDINGS:   There is a minor element of age-appropriate parenchymal volume loss.  There are few punctate sub cm subcortical white matter T2 hyperintensities, within normal limits for age.  There is no pathologic signal alteration or abnormal mass effect within the brain parenchyma.  Flow voids are present within the major intracranial branches of the arterial circulation, as well as within the superior sagittal sinus.  Midline structures are normally formed.  The cerebellar tonsils are normally positioned.  The visualized paranasal sinuses are clear.    CONCLUSION:   1. Normal MRI of the brain for age.  No acute intracranial finding.              Signed  by: Bebe Manny on 10/01/2019 at 7:32            [unfilled]  Immunization History   Administered Date(s) Administered   • COVID-19 (PFIZER) PURPLE CAP 08/27/2021, 10/01/2021     The following portions of the patient's history were reviewed and updated as appropriate: allergies, current medications, past family history, past medical history, past social history, past surgical history and problem list.    PHQ-9 Total Score:           Physical Exam  Constitutional:       Appearance: Normal appearance. She is underweight.   HENT:      Head: Normocephalic and atraumatic.      Right Ear: External ear normal.      Left Ear: External ear normal.   Eyes:      General:         Right eye: No discharge.         Left eye: No discharge.      Conjunctiva/sclera: Conjunctivae normal.   Cardiovascular:      Rate and Rhythm: Normal rate and regular rhythm.      Pulses: Normal pulses.      Heart sounds: Normal heart sounds. No murmur heard.  Pulmonary:      Effort: Pulmonary effort is normal. No respiratory distress.      Breath sounds: Normal breath sounds.   Abdominal:      General: There is no distension.      Palpations: Abdomen is soft.      Tenderness: There is no abdominal tenderness.   Musculoskeletal:      Cervical back: Normal range of motion.      Right lower leg: No edema.      Left lower leg: No edema.   Lymphadenopathy:      Cervical: No cervical adenopathy.   Neurological:      Mental Status: She is alert. Mental status is at baseline.   Psychiatric:         Mood and Affect: Mood normal.         Behavior: Behavior normal.       Assessment & Plan    Diagnosis Plan   1. Anxiety  Ambulatory Referral to Behavioral Health   2. Hypokalemia     3. Visual hallucinations     4. Encounter for screening mammogram for malignant neoplasm of breast  Mammo Screening Bilateral With CAD   5. Screen for colon cancer  Cologuard - Stool, Per Rectum   6. Body mass index (BMI) 19.9 or less, adult  Ambulatory Referral to Nutrition  Services   7. Cervical radiculopathy  gabapentin (NEURONTIN) 300 MG capsule      Orders Placed This Encounter   Procedures   • Mammo Screening Bilateral With CAD     Standing Status:   Future     Standing Expiration Date:   7/19/2023     Scheduling Instructions:      Deana Quesada     Order Specific Question:   Reason for Exam:     Answer:   Screening     Order Specific Question:   Release to patient     Answer:   Immediate   • Cologuard - Stool, Per Rectum     Standing Status:   Future     Standing Expiration Date:   7/19/2023     Order Specific Question:   Release to patient     Answer:   Immediate   • Ambulatory Referral to Behavioral Health     Referral Priority:   Routine     Referral Type:   Behavorial Health/Psych     Referral Reason:   Specialty Services Required     Requested Specialty:   Behavioral Health     Number of Visits Requested:   1   • Ambulatory Referral to Nutrition Services     Referral Priority:   Routine     Referral Type:   Health Education     Referral Reason:   Specialty Services Required     Requested Specialty:   Nutrition     Number of Visits Requested:   1     Hospital discharge follow-up; will obtain prior records, history of hallucinations concerning, will discontinue Flexeril due to delirium concerns.  Advised on high risk nature of opiate medications.  Patient hemodynamically stable, saturating well on room air, COPD unlikely cause of delirium at this time.  Reports thorough work-up including CT scan, lumbar puncture, reassuring.  Follow-up in 1 month, strict ER precaution given.  Additionally, due to anxiety, will refer to psychology, patient declined psychiatric evaluation at this time.  Consider at subsequent appointments, no history of cielo.  Does admit to poor sleep, counseled at length importance of sleep hygiene as sleep deprivation can be responsible for decreased cognitive function and in extreme cases, sleep deprivation psychosis.  Consider referral to sleep medicine at  follow-up.    Cervical radiculopathy; patient states was being seen by neurology, and prescribed gabapentin 300 mg twice daily.  States has helped with headaches immensely, no adverse effects of medications, will continue.  Sonido reviewed and appropriate, no signs of abuse, misuse, will refill.    Low BMI; continue to encourage increased p.o. intake, weight gain.  Patient states is amenable to nutrition services.  Will refer.            This document has been electronically signed by Pk Barton MD on July 19, 2022 18:45 CDT    EMR Dragon/Transciption Disclaimer: Some of this note may be an electronic transcription/translation of spoken language to printed text.  The electronic translation of spoken language may permit erroneous, or at times, nonsensical words or phrases to be inadvertently transcribed. Although I have reviewed the note for such errors, some may still exist.

## 2022-08-08 RX ORDER — SUCRALFATE 1 G/1
TABLET ORAL
Qty: 360 TABLET | Refills: 0 | Status: SHIPPED | OUTPATIENT
Start: 2022-08-08

## 2022-08-08 NOTE — TELEPHONE ENCOUNTER
Rx Refill Note  Requested Prescriptions     Pending Prescriptions Disp Refills   • sucralfate (CARAFATE) 1 g tablet [Pharmacy Med Name: SUCRALFATE 1GM TABLETS] 360 tablet      Sig: TAKE 1 TABLET BY MOUTH FOUR TIMES DAILY      Last office visit with prescribing clinician: 7/19/2022      Next office visit with prescribing clinician: 8/26/2022            Tiffany Hwang MA  08/08/22, 11:26 CDT

## 2022-09-01 ENCOUNTER — TELEPHONE (OUTPATIENT)
Dept: FAMILY MEDICINE CLINIC | Facility: CLINIC | Age: 62
End: 2022-09-01

## 2022-09-01 NOTE — TELEPHONE ENCOUNTER
Patient left a voicemail stating she wanted to see Dr. Barton for a pulled and numb hand as well as other things she only wanted to discuss with him.  Called patient back and let her know that Dr. Barton was out of office this week. Recommended patient go to ED for symptoms and patient stated she probably couldn't get a ride, that she may have just laid on it funny, or pinched a nerve. She said it's not the first time, she's had it happen before and that it usually goes away. Patient plans on making a Same Day appointment tomorrow unless her symptoms get worse, then she'll go to the ER.

## 2022-09-02 ENCOUNTER — OFFICE VISIT (OUTPATIENT)
Dept: FAMILY MEDICINE CLINIC | Facility: CLINIC | Age: 62
End: 2022-09-02

## 2022-09-02 VITALS
SYSTOLIC BLOOD PRESSURE: 188 MMHG | HEIGHT: 66 IN | WEIGHT: 94 LBS | DIASTOLIC BLOOD PRESSURE: 102 MMHG | OXYGEN SATURATION: 99 % | BODY MASS INDEX: 15.11 KG/M2 | TEMPERATURE: 98.4 F | HEART RATE: 96 BPM

## 2022-09-02 DIAGNOSIS — R20.0 NUMBNESS AROUND MOUTH: ICD-10-CM

## 2022-09-02 DIAGNOSIS — S61.219A SUPERFICIAL LACERATION OF FINGER: ICD-10-CM

## 2022-09-02 DIAGNOSIS — R47.81 SLURRED SPEECH: ICD-10-CM

## 2022-09-02 DIAGNOSIS — R53.1 RIGHT SIDED WEAKNESS: ICD-10-CM

## 2022-09-02 DIAGNOSIS — R20.2 NUMBNESS AND TINGLING OF FOOT: ICD-10-CM

## 2022-09-02 DIAGNOSIS — R01.1 HEART MURMUR: Chronic | ICD-10-CM

## 2022-09-02 DIAGNOSIS — R20.0 NUMBNESS AND TINGLING IN RIGHT HAND: ICD-10-CM

## 2022-09-02 DIAGNOSIS — R20.0 NUMBNESS AND TINGLING OF FOOT: ICD-10-CM

## 2022-09-02 DIAGNOSIS — R20.2 NUMBNESS AND TINGLING IN RIGHT HAND: ICD-10-CM

## 2022-09-02 DIAGNOSIS — I10 HYPERTENSION, UNSPECIFIED TYPE: Primary | Chronic | ICD-10-CM

## 2022-09-02 PROCEDURE — 99214 OFFICE O/P EST MOD 30 MIN: CPT | Performed by: NURSE PRACTITIONER

## 2022-09-02 RX ORDER — LOSARTAN POTASSIUM 25 MG/1
25 TABLET ORAL DAILY
Qty: 30 TABLET | Refills: 0 | Status: SHIPPED | OUTPATIENT
Start: 2022-09-02 | End: 2022-09-30 | Stop reason: SDUPTHER

## 2022-09-02 NOTE — PROGRESS NOTES
"Chief Complaint  Numbness    Subjective          Brianna Miller presents to [facility]    PCP: Dr. Barton    CC: \"numbness, cut on finger\"    Presents with daughter with complaints of having an episode a couple of weeks ago in which she had a hard time walking, had some slurred speech and a significant right hand tremor.  This is first medical evaluation since she had the episode.  Reports since the episode, there has been no additional slurred speech or difficulty with walking. Tremor has stopped, but now has numbness in right hand to mid forearm, numbness in right foot and numbness around lips.  There has been no facial droop noted.  C/O chronic headaches.  Sees pain management and on morphine, but also taking frequent excedrin pills daily. Has noted some easy bruising with bumps or scratches.  Hx of HTN, was previously on Verapamil, but did not tolerate and stopped taking in June.  BP at PCP office in July was 146/88.  BP is not checked regularly at home.      Reports she cut pinky finger on lid to can of dog food a few days ago.  Treating with triple antibiotic ointment and seems to be healing without problems.      Had cardiology referral placed for murmur, HTN in May.  Doesn't appear they were able to reach patient with appt.        Review of Systems   Eyes: Negative.    Respiratory: Negative.    Cardiovascular: Negative.    Gastrointestinal: Negative.         Hx of PUD, GERD, but denies any new abdominal pain   Genitourinary: Negative.    Musculoskeletal: Positive for myalgias (chronic--reported hx of fibromyalgia).   Skin: Positive for wound (cut on right pinky; ).        Bruising     Neurological: Positive for tremors (right hand 2 weeks ago, resolved), speech difficulty (slurred speech 2 weeks ago, resolved), weakness (2 weeks ago, resolved), numbness (right hand, right foot, lips) and headaches (chronic). Negative for " "dizziness.   Hematological: Bruises/bleeds easily (if scratched, bumped).   Psychiatric/Behavioral: Positive for sleep disturbance. Negative for self-injury and suicidal ideas. The patient is nervous/anxious ( long hx of anxiety noted).         Objective   Vital Signs:   BP (!) 188/102 (BP Location: Right arm, Patient Position: Sitting, Cuff Size: Pediatric)   Pulse 96   Temp 98.4 °F (36.9 °C)   Ht 167.6 cm (66\")   Wt 42.6 kg (94 lb)   SpO2 99%   BMI 15.17 kg/m²       Physical Exam  Vitals and nursing note reviewed.   Constitutional:       General: She is not in acute distress.     Appearance: She is underweight. She is not ill-appearing.   HENT:      Head: Normocephalic and atraumatic.      Right Ear: Tympanic membrane and ear canal normal.      Left Ear: Tympanic membrane and ear canal normal.      Nose: Nose normal. No congestion.      Mouth/Throat:      Mouth: Mucous membranes are moist.      Pharynx: Oropharynx is clear. No oropharyngeal exudate or posterior oropharyngeal erythema.   Eyes:      General: No visual field deficit.        Right eye: No discharge.         Left eye: No discharge.      Extraocular Movements: Extraocular movements intact.      Conjunctiva/sclera: Conjunctivae normal.      Pupils: Pupils are equal, round, and reactive to light.   Cardiovascular:      Rate and Rhythm: Normal rate and regular rhythm.      Heart sounds: Murmur heard.   Pulmonary:      Effort: Pulmonary effort is normal. No respiratory distress.      Breath sounds: No wheezing, rhonchi or rales.      Comments: Diminished, but clear    Abdominal:      Tenderness: There is no abdominal tenderness.   Musculoskeletal:      Cervical back: Neck supple. No tenderness.   Lymphadenopathy:      Cervical: No cervical adenopathy.   Skin:     General: Skin is warm and dry.      Findings: Ecchymosis and laceration present.          Neurological:      Mental Status: She is alert and oriented to person, place, and time.      Cranial " Nerves: No facial asymmetry.      Motor: No weakness, tremor ( none currently noted), atrophy or pronator drift.      Coordination: Coordination normal.      Gait: Gait is intact.   Psychiatric:         Attention and Perception: Attention normal.         Mood and Affect: Mood is anxious.         Speech: Speech normal.         Behavior: Behavior is cooperative.          Result Review :                   Assessment and Plan    Diagnoses and all orders for this visit:    1. Hypertension, unspecified type (Primary)  -     losartan (Cozaar) 25 MG tablet; Take 1 tablet by mouth Daily.  Dispense: 30 tablet; Refill: 0  -     Ambulatory Referral to Cardiology    2. Heart murmur  -     Ambulatory Referral to Cardiology    3. Numbness and tingling of foot  -     MRI Brain With & Without Contrast; Future    4. Numbness and tingling in right hand  -     MRI Brain With & Without Contrast; Future    5. Numbness around mouth  -     MRI Brain With & Without Contrast; Future    6. Slurred speech  Comments:  recent history    Orders:  -     MRI Brain With & Without Contrast; Future    7. Right sided weakness  Comments:  recent history  Orders:  -     MRI Brain With & Without Contrast; Future    8. Superficial laceration of finger  Comments:  healing    Restart BP meds.  Rx for Losartan 25 mg daily.  Has f/u with PCP in 2 weeks. Encouraged to keep home BP log and bring to appointment.   Resubmit cardiology referral.     Referral for MRI due to numbness, recent neuro symptoms  ER if slurred speech, one sided weakness return    Encouraged to back off of use of Excedrin migraine multiple times per day due to amount of ASA    Keep scheduled appt with PCP in 2 weeks as scheduled for recheck       This document has been electronically signed by LAUREN Lucero on September 2, 2022 19:04 CDT,.      .

## 2022-09-30 ENCOUNTER — OFFICE VISIT (OUTPATIENT)
Dept: FAMILY MEDICINE CLINIC | Facility: CLINIC | Age: 62
End: 2022-09-30

## 2022-09-30 VITALS
SYSTOLIC BLOOD PRESSURE: 140 MMHG | OXYGEN SATURATION: 97 % | HEART RATE: 76 BPM | TEMPERATURE: 98.1 F | WEIGHT: 96.8 LBS | BODY MASS INDEX: 15.56 KG/M2 | HEIGHT: 66 IN | DIASTOLIC BLOOD PRESSURE: 90 MMHG

## 2022-09-30 DIAGNOSIS — I10 HYPERTENSION, UNSPECIFIED TYPE: Chronic | ICD-10-CM

## 2022-09-30 DIAGNOSIS — S41.111A SKIN TEAR OF RIGHT UPPER ARM WITHOUT COMPLICATION, INITIAL ENCOUNTER: ICD-10-CM

## 2022-09-30 DIAGNOSIS — S41.112A SKIN TEAR OF LEFT UPPER ARM WITHOUT COMPLICATION, INITIAL ENCOUNTER: Primary | ICD-10-CM

## 2022-09-30 PROCEDURE — 99214 OFFICE O/P EST MOD 30 MIN: CPT | Performed by: NURSE PRACTITIONER

## 2022-09-30 RX ORDER — MAG HYDROX/ALUMINUM HYD/SIMETH 400-400-40
SUSPENSION, ORAL (FINAL DOSE FORM) ORAL
Qty: 210 ML | Refills: 1 | Status: SHIPPED | OUTPATIENT
Start: 2022-09-30 | End: 2022-09-30 | Stop reason: SDUPTHER

## 2022-09-30 RX ORDER — LOSARTAN POTASSIUM 25 MG/1
25 TABLET ORAL DAILY
Qty: 30 TABLET | Refills: 0 | Status: SHIPPED | OUTPATIENT
Start: 2022-09-30 | End: 2022-10-05

## 2022-09-30 RX ORDER — MAG HYDROX/ALUMINUM HYD/SIMETH 400-400-40
SUSPENSION, ORAL (FINAL DOSE FORM) ORAL
Qty: 210 ML | Refills: 1 | Status: SHIPPED | OUTPATIENT
Start: 2022-09-30 | End: 2022-11-29

## 2022-09-30 NOTE — PROGRESS NOTES
"Chief Complaint  Pain (Left arm)    Subjective          Brianna Miller presents to [facility] - [place]    History of Present Illness  [facility]    PCP: Dr. Barton--has missed last two appointments, canceled and no show, scheduled for 10-    CC: \"skin tears\"    Reports earlier this week, she slipped on a non  bathroom rug and fell, hit the wall and tore skin on her arms and has bruising.  Arms are sore at site of bruising, but still able to move, declining xrays today.  Has been using neosporin over the areas.  She take excedrin with aspirin fairly regularly for headaches, but otherwise not on any anticoagulants.  Reports she did not hit her head.  She was seen by me on 9-2 for some neurological changes and MRI ordered.  Daughter was left messages regarding appt date/time, but apparently never received them.  This was rescheduled today while in office for 10- at [facility].  She was also started on Losartan for HTN due to not being able to tolerate Verapamil, reports she never picked up meds.  BP is some better today, but she reports it normally always runs high.  Requesting that Rx be sent to [facility] in [place].  Daughter is not with her in office today, patient reports she is in the car with the dog.         Review of Systems   Respiratory: Positive for wheezing (occasional--has inhaler as needed). Negative for cough, chest tightness and shortness of breath.    Cardiovascular: Negative.    Genitourinary: Negative.    Musculoskeletal: Positive for myalgias.   Skin: Positive for color change (bruising to arms) and wound (skin tears to left upper arm, right lower arm).   Neurological: Positive for headaches (chronic).        Objective   Vital Signs:   /90 (BP Location: Left arm, Patient Position: Sitting)   Pulse 76   Temp 98.1 °F (36.7 °C)   Ht 167.6 cm (66\")   Wt 43.9 kg (96 lb 12.8 oz)   SpO2 97%   BMI " 15.62 kg/m²       Physical Exam  Vitals and nursing note reviewed.   Constitutional:       General: She is not in acute distress.     Appearance: She is cachectic. She is not ill-appearing.   HENT:      Head: Normocephalic and atraumatic.   Cardiovascular:      Rate and Rhythm: Normal rate and regular rhythm.      Heart sounds: Murmur heard.   Pulmonary:      Effort: Pulmonary effort is normal. No respiratory distress.      Breath sounds: Wheezing ( lower lungs, clear with cough) present. No rhonchi or rales.   Skin:     Findings: Bruising (bilateral arms) and wound (skin tears x 3) present.          Neurological:      Mental Status: She is alert.      Motor: Weakness (generalized) present.   Psychiatric:         Behavior: Behavior is cooperative.         Cognition and Memory: She exhibits impaired recent memory ( asked for a lot of things to repeated after they had already been discussed, forgot what she had been told).          Result Review :                 Assessment and Plan    Diagnoses and all orders for this visit:    1. Skin tear of left upper arm without complication, initial encounter (Primary)  -     Discontinue: Sodium Chloride (Saline Wound Wash) 0.9 % solution; Saline wet to dry dressings BID x 7 days  Dispense: 210 mL; Refill: 1  -     Sodium Chloride (Saline Wound Wash) 0.9 % solution; Saline wet to dry dressings BID x 7 days  Dispense: 210 mL; Refill: 1    2. Hypertension, unspecified type  -     losartan (Cozaar) 25 MG tablet; Take 1 tablet by mouth Daily.  Dispense: 30 tablet; Refill: 0    3. Skin tear of right upper arm without complication, initial encounter  -     Discontinue: Sodium Chloride (Saline Wound Wash) 0.9 % solution; Saline wet to dry dressings BID x 7 days  Dispense: 210 mL; Refill: 1  -     Sodium Chloride (Saline Wound Wash) 0.9 % solution; Saline wet to dry dressings BID x 7 days  Dispense: 210 mL; Refill: 1    Saline wet to dry dressing changes in office and instructions given  to daughter regarding changing BID x 7-10 days.  RTC for any s/s of secondary infection.   Declines xrays.   Resent Losartan Rx for HTN.   Stressed importance of keeping MRI appt and PCP follow up appointments.       This document has been electronically signed by LAUREN Lucero on September 30, 2022 17:57 CDT,.      I spent 30 minutes caring for Brianna on this date of service. This time includes time spent by me in the following activities:preparing for the visit, performing a medically appropriate examination and/or evaluation , counseling and educating the patient/family/caregiver, ordering medications, tests, or procedures and documenting information in the medical record      After visit, daughter reports that Rx needs to go to [facility], Saline Wash is resent.

## 2022-09-30 NOTE — PATIENT INSTRUCTIONS
Saline wet to dry dressing twice daily to skin tears on left and right arms x 7-10 days until healed.

## 2022-10-04 NOTE — TELEPHONE ENCOUNTER
Rx Refill Note  Requested Prescriptions     Pending Prescriptions Disp Refills   • losartan (COZAAR) 25 MG tablet [Pharmacy Med Name: LOSARTAN 25MG TABLETS] 90 tablet      Sig: TAKE 1 TABLET BY MOUTH DAILY      Last office visit with prescribing clinician: 9/30/2022  (LAUREN Art)    Next office visit with prescribing clinician: 10/14/22 (Dr Barton)           Tiffany Hwang MA  10/04/22, 08:59 CDT

## 2022-10-05 DIAGNOSIS — I10 HYPERTENSION, UNSPECIFIED TYPE: Chronic | ICD-10-CM

## 2022-10-05 RX ORDER — LOSARTAN POTASSIUM 25 MG/1
25 TABLET ORAL DAILY
Qty: 90 TABLET | Refills: 0 | Status: SHIPPED | OUTPATIENT
Start: 2022-10-05 | End: 2022-11-29

## 2022-10-05 RX ORDER — LOSARTAN POTASSIUM 25 MG/1
25 TABLET ORAL DAILY
Qty: 30 TABLET | Refills: 0 | Status: SHIPPED | OUTPATIENT
Start: 2022-10-05 | End: 2022-10-05

## 2022-10-05 NOTE — TELEPHONE ENCOUNTER
Rx Refill Note  Requested Prescriptions     Pending Prescriptions Disp Refills   • losartan (COZAAR) 25 MG tablet [Pharmacy Med Name: LOSARTAN 25MG TABLETS] 90 tablet      Sig: TAKE 1 TABLET BY MOUTH DAILY      Last office visit with prescribing clinician: 7/19/2022      Next office visit with prescribing clinician: 10/14/2022            Tiffany Hwang MA  10/05/22, 11:59 CDT     Patient requesting a 90 day supply

## 2022-10-12 DIAGNOSIS — M54.12 CERVICAL RADICULOPATHY: ICD-10-CM

## 2022-10-12 RX ORDER — GABAPENTIN 300 MG/1
CAPSULE ORAL
Qty: 90 CAPSULE | Refills: 0 | Status: SHIPPED | OUTPATIENT
Start: 2022-10-12 | End: 2022-11-29 | Stop reason: SDUPTHER

## 2022-10-12 NOTE — TELEPHONE ENCOUNTER
Rx Refill Note  Requested Prescriptions     Pending Prescriptions Disp Refills   • gabapentin (NEURONTIN) 300 MG capsule [Pharmacy Med Name: GABAPENTIN 300MG CAPSULES] 90 capsule      Sig: TAKE 1 CAPSULE BY MOUTH THREE TIMES DAILY      Last office visit with prescribing clinician: 7/19/2022      Next office visit with prescribing clinician: 10/28/2022            Tiffany Hwang MA  10/12/22, 13:20 CDT

## 2022-11-23 DIAGNOSIS — M54.12 CERVICAL RADICULOPATHY: ICD-10-CM

## 2022-11-27 RX ORDER — GABAPENTIN 300 MG/1
300 CAPSULE ORAL 3 TIMES DAILY
Qty: 90 CAPSULE | Refills: 0 | OUTPATIENT
Start: 2022-11-27

## 2022-11-29 ENCOUNTER — OFFICE VISIT (OUTPATIENT)
Dept: FAMILY MEDICINE CLINIC | Facility: CLINIC | Age: 62
End: 2022-11-29

## 2022-11-29 VITALS
HEIGHT: 66 IN | SYSTOLIC BLOOD PRESSURE: 178 MMHG | DIASTOLIC BLOOD PRESSURE: 100 MMHG | WEIGHT: 96 LBS | HEART RATE: 93 BPM | TEMPERATURE: 96 F | BODY MASS INDEX: 15.43 KG/M2 | OXYGEN SATURATION: 99 %

## 2022-11-29 DIAGNOSIS — I10 HYPERTENSION, UNSPECIFIED TYPE: Primary | ICD-10-CM

## 2022-11-29 DIAGNOSIS — M54.12 CERVICAL RADICULOPATHY: ICD-10-CM

## 2022-11-29 PROCEDURE — 99214 OFFICE O/P EST MOD 30 MIN: CPT | Performed by: STUDENT IN AN ORGANIZED HEALTH CARE EDUCATION/TRAINING PROGRAM

## 2022-11-29 RX ORDER — GABAPENTIN 300 MG/1
300 CAPSULE ORAL 3 TIMES DAILY
Qty: 90 CAPSULE | Refills: 2 | Status: SHIPPED | OUTPATIENT
Start: 2022-11-29 | End: 2022-11-29 | Stop reason: SDUPTHER

## 2022-11-29 RX ORDER — AMLODIPINE BESYLATE 5 MG/1
5 TABLET ORAL DAILY
Qty: 90 TABLET | Refills: 1 | Status: SHIPPED | OUTPATIENT
Start: 2022-11-29

## 2022-11-29 NOTE — TELEPHONE ENCOUNTER
Received a call from [facility] stating that patient is only picking up her gabapentin and Morphine and is not picking up any of her other maintenance medication.   The pharmacy is requesting that she be referred back to pain management or at least send the gabapentin to the [facility] in [place]. It was suggested that the script needs to go to the same pharmacy as her Morphine. They are going to cancel the script [facility] gabapentin at the [facility] in [place] due to drug seeking behavior and said it needs to be sent to the store in [place] so that pain management can see that she is on Gabapentin and her Sonido will show that if they check for [place].

## 2022-11-30 RX ORDER — GABAPENTIN 300 MG/1
300 CAPSULE ORAL 3 TIMES DAILY
Qty: 90 CAPSULE | Refills: 2 | Status: SHIPPED | OUTPATIENT
Start: 2022-11-30 | End: 2023-02-22

## 2022-11-30 NOTE — TELEPHONE ENCOUNTER
Patient called in regards to her gabapentin. Let patient know that the request had been sent to the provider, but he is out of office on Wednesdays. Patient verbalized understanding and stated she is completely out.  Call back number: [phone]

## 2022-12-05 ENCOUNTER — TELEPHONE (OUTPATIENT)
Dept: FAMILY MEDICINE CLINIC | Facility: CLINIC | Age: 62
End: 2022-12-05

## 2022-12-05 NOTE — TELEPHONE ENCOUNTER
I called patient, I did not leave voicemail for patient as there was no name identified on the voicemail. If patient returns call please send her to me. Thank you.

## 2023-01-30 ENCOUNTER — DOCUMENTATION (OUTPATIENT)
Dept: NUTRITION | Facility: HOSPITAL | Age: 63
End: 2023-01-30
Payer: MEDICARE

## 2023-01-30 NOTE — PROGRESS NOTES
N[facility]    Patient Name:  Brianna Miller  YOB: 1960  MRN: [id number]  Admit Date:  (Not on file)    Pt was been contacted twice/left a message with no return call and a letter mailed from the referral coordinator. RDN made provider aware and will follow if patient is interested in talking with RDN.    Electronically signed by:  Sharlene Forman RD  01/30/23 12:01 CST

## 2023-02-21 DIAGNOSIS — M54.12 CERVICAL RADICULOPATHY: ICD-10-CM

## 2023-02-22 RX ORDER — GABAPENTIN 300 MG/1
CAPSULE ORAL
Qty: 90 CAPSULE | Refills: 0 | Status: SHIPPED | OUTPATIENT
Start: 2023-02-22 | End: 2023-03-13 | Stop reason: SDUPTHER

## 2023-03-13 ENCOUNTER — OFFICE VISIT (OUTPATIENT)
Dept: FAMILY MEDICINE CLINIC | Facility: CLINIC | Age: 63
End: 2023-03-13
Payer: MEDICARE

## 2023-03-13 ENCOUNTER — LAB (OUTPATIENT)
Dept: LAB | Facility: HOSPITAL | Age: 63
End: 2023-03-13
Payer: MEDICARE

## 2023-03-13 VITALS
HEART RATE: 87 BPM | BODY MASS INDEX: 15.27 KG/M2 | OXYGEN SATURATION: 98 % | SYSTOLIC BLOOD PRESSURE: 134 MMHG | TEMPERATURE: 98.9 F | DIASTOLIC BLOOD PRESSURE: 80 MMHG | WEIGHT: 95 LBS | HEIGHT: 66 IN

## 2023-03-13 DIAGNOSIS — E55.9 VITAMIN D DEFICIENCY DISEASE: ICD-10-CM

## 2023-03-13 DIAGNOSIS — Z11.59 NEED FOR HEPATITIS C SCREENING TEST: ICD-10-CM

## 2023-03-13 DIAGNOSIS — R10.13 EPIGASTRIC PAIN: ICD-10-CM

## 2023-03-13 DIAGNOSIS — M54.12 CERVICAL RADICULOPATHY: Primary | ICD-10-CM

## 2023-03-13 DIAGNOSIS — R23.3 EASY BRUISING: ICD-10-CM

## 2023-03-13 DIAGNOSIS — Z13.220 LIPID SCREENING: ICD-10-CM

## 2023-03-13 PROCEDURE — 80053 COMPREHEN METABOLIC PANEL: CPT

## 2023-03-13 PROCEDURE — 85025 COMPLETE CBC W/AUTO DIFF WBC: CPT | Performed by: STUDENT IN AN ORGANIZED HEALTH CARE EDUCATION/TRAINING PROGRAM

## 2023-03-13 PROCEDURE — 82607 VITAMIN B-12: CPT | Performed by: STUDENT IN AN ORGANIZED HEALTH CARE EDUCATION/TRAINING PROGRAM

## 2023-03-13 PROCEDURE — 80061 LIPID PANEL: CPT | Performed by: STUDENT IN AN ORGANIZED HEALTH CARE EDUCATION/TRAINING PROGRAM

## 2023-03-13 PROCEDURE — 86803 HEPATITIS C AB TEST: CPT | Performed by: STUDENT IN AN ORGANIZED HEALTH CARE EDUCATION/TRAINING PROGRAM

## 2023-03-13 PROCEDURE — 83690 ASSAY OF LIPASE: CPT

## 2023-03-13 PROCEDURE — 82150 ASSAY OF AMYLASE: CPT

## 2023-03-13 PROCEDURE — 82306 VITAMIN D 25 HYDROXY: CPT | Performed by: STUDENT IN AN ORGANIZED HEALTH CARE EDUCATION/TRAINING PROGRAM

## 2023-03-13 PROCEDURE — 99214 OFFICE O/P EST MOD 30 MIN: CPT | Performed by: STUDENT IN AN ORGANIZED HEALTH CARE EDUCATION/TRAINING PROGRAM

## 2023-03-13 PROCEDURE — 82746 ASSAY OF FOLIC ACID SERUM: CPT | Performed by: STUDENT IN AN ORGANIZED HEALTH CARE EDUCATION/TRAINING PROGRAM

## 2023-03-13 PROCEDURE — 86677 HELICOBACTER PYLORI ANTIBODY: CPT

## 2023-03-13 PROCEDURE — 85610 PROTHROMBIN TIME: CPT | Performed by: STUDENT IN AN ORGANIZED HEALTH CARE EDUCATION/TRAINING PROGRAM

## 2023-03-13 RX ORDER — GABAPENTIN 300 MG/1
300 CAPSULE ORAL 3 TIMES DAILY
Qty: 90 CAPSULE | Refills: 1 | Status: SHIPPED | OUTPATIENT
Start: 2023-03-13

## 2023-03-13 NOTE — PROGRESS NOTES
"Subjective:  Brianna Miller is a 62 y.o. female who presents for      Cervical radiculopathy; currently on gabapentin 300 mg 3 times daily, and MS Contin via pain management.  States that gabapentin provided good relief, no adverse effects, sedation, mood disturbance, sleep disturbance, weakness.  Happy with current management, states medication improves quality life, denied increased use.    Easy bruising; states for the past several months, has noticed increased bruising over forearms, denies any trauma, easy bleeding, hematochezia, hematemesis, swelling.    Patient Active Problem List   Diagnosis   • Cervical radiculopathy   • Gluteal tendinitis   • Hiatal hernia   • Knee pain   • Low back pain   • Migraine   • Muscle pain   • Neck pain   • Pain in right hand   • Peptic ulcer   • Anxiety state   • Fracture of hand   • Fibromyalgia   • Body mass index (BMI) 19.9 or less, adult   • Finding of above normal blood pressure   • Gastro-esophageal reflux disease without esophagitis   • Hypertension   • Heart murmur     Vitals:    Vitals:    03/13/23 1319   BP: 134/80   Pulse: 87   Temp: 98.9 °F (37.2 °C)   SpO2: 98%   Weight: 43.1 kg (95 lb)   Height: 167.6 cm (66\")     Body mass index is 15.33 kg/m².      Current Outpatient Medications:   •  albuterol sulfate  (90 Base) MCG/ACT inhaler, Inhale 2 puffs Every 4 (Four) Hours As Needed for Wheezing., Disp: 18 g, Rfl: 3  •  amLODIPine (NORVASC) 5 MG tablet, Take 1 tablet by mouth Daily. May increase to 2 tablets daily if needed., Disp: 90 tablet, Rfl: 1  •  aspirin-acetaminophen-caffeine (Excedrin Migraine) 250-250-65 MG per tablet, Take 1 tablet by mouth Daily., Disp: , Rfl:   •  Budeson-Glycopyrrol-Formoterol (BREZTRI) 160-9-4.8 MCG/ACT aerosol inhaler, Inhale 2 puffs 2 (Two) Times a Day., Disp: 10.7 g, Rfl: 1  •  gabapentin (NEURONTIN) 300 MG capsule, Take 1 capsule by mouth 3 (Three) Times a Day., Disp: 90 capsule, Rfl: 1  •  Morphine (MS CONTIN) 30 MG 12 hr " tablet, Take 1 tablet by mouth 2 (Two) Times a Day., Disp: , Rfl:   •  omeprazole (priLOSEC) 40 MG capsule, Take 1 capsule by mouth Daily., Disp: 30 capsule, Rfl: 1  •  sucralfate (CARAFATE) 1 g tablet, TAKE 1 TABLET BY MOUTH FOUR TIMES DAILY, Disp: 360 tablet, Rfl: 0    Patient Active Problem List   Diagnosis   • Cervical radiculopathy   • Gluteal tendinitis   • Hiatal hernia   • Knee pain   • Low back pain   • Migraine   • Muscle pain   • Neck pain   • Pain in right hand   • Peptic ulcer   • Anxiety state   • Fracture of hand   • Fibromyalgia   • Body mass index (BMI) 19.9 or less, adult   • Finding of above normal blood pressure   • Gastro-esophageal reflux disease without esophagitis   • Hypertension   • Heart murmur     Past Surgical History:   Procedure Laterality Date   • BACK SURGERY     • CYST REMOVAL     • TONSILLECTOMY       Social History     Socioeconomic History   • Marital status:    Tobacco Use   • Smoking status: Every Day     Packs/day: 0.25     Types: Cigarettes   • Smokeless tobacco: Never   Vaping Use   • Vaping Use: Never used   Substance and Sexual Activity   • Alcohol use: Never   • Drug use: Never   • Sexual activity: Defer     History reviewed. No pertinent family history.  No visits with results within 6 Month(s) from this visit.   Latest known visit with results is:   Office Visit on 03/22/2022   Component Date Value Ref Range Status   • QT Interval 03/22/2022 350  ms Final   • QTC Interval 03/22/2022 418  ms Final      XR Chest PA & Lateral  Narrative: Chest x-ray PA and lateral     CLINICAL INDICATION: Shortness of breath. Left-sided chest pain.    COMPARISON: None    FINDINGS: Cardiac silhouette is normal in size. Pulmonary  vascularity is unremarkable.     Lack of markings, emphysematous changes in both apices.    Flattening both diaphragms and increase in the retrosternal  airspace suggesting air trapping, COPD.    The lungs are otherwise clear.  Impression: Emphysematous  changes in both apices. Flattening both  diaphragms and increase in the retrosternal airspace suggesting  air trapping, COPD. Otherwise unremarkable chest.    Electronically signed by:  Babar Che MD  8/19/2021 4:03 PM CDT  Workstation: [id number]      [unfilled]  Immunization History   Administered Date(s) Administered   • COVID-19 (PFIZER) PURPLE CAP 08/27/2021, 10/01/2021     The following portions of the patient's history were reviewed and updated as appropriate: allergies, current medications, past family history, past medical history, past social history, past surgical history and problem list.    PHQ-9 Total Score: 9         Physical Exam  Constitutional:       Appearance: Normal appearance.   HENT:      Head: Normocephalic and atraumatic.      Right Ear: External ear normal.      Left Ear: External ear normal.   Eyes:      General:         Right eye: No discharge.         Left eye: No discharge.      Conjunctiva/sclera: Conjunctivae normal.   Cardiovascular:      Rate and Rhythm: Normal rate and regular rhythm.      Pulses: Normal pulses.      Heart sounds: Normal heart sounds. No murmur heard.  Pulmonary:      Effort: Pulmonary effort is normal. No respiratory distress.      Breath sounds: Normal breath sounds.   Abdominal:      General: There is no distension.      Palpations: Abdomen is soft.      Tenderness: There is no abdominal tenderness.   Musculoskeletal:      Cervical back: Normal range of motion.      Right lower leg: No edema.      Left lower leg: No edema.   Lymphadenopathy:      Cervical: No cervical adenopathy.   Skin:     Comments: Ecchymosis over bilateral forearms.   Neurological:      Mental Status: She is alert. Mental status is at baseline.   Psychiatric:         Mood and Affect: Mood normal.         Behavior: Behavior normal.       Assessment & Plan    Diagnosis Plan   1. Cervical radiculopathy  gabapentin (NEURONTIN) 300 MG capsule      2. Body mass index (BMI) 19.9 or less, adult   CBC & Differential    Vitamin B12    Folate    Vitamin D,25-Hydroxy      3. Easy bruising  Protime-INR    Vitamin B12    Folate    Vitamin D,25-Hydroxy      4. Lipid screening  Lipid Panel      5. Vitamin D deficiency disease  Vitamin D,25-Hydroxy      6. Need for hepatitis C screening test  HCV Antibody Rfx To Qnt PCR         Orders Placed This Encounter   Procedures   • Lipid Panel   • Protime-INR     Order Specific Question:   Release to patient     Answer:   Routine Release   • Vitamin B12     Order Specific Question:   Release to patient     Answer:   Routine Release   • Folate     Order Specific Question:   Release to patient     Answer:   Routine Release   • Vitamin D,25-Hydroxy     Order Specific Question:   Release to patient     Answer:   Immediate   • HCV Antibody Rfx To Qnt PCR   • CBC Auto Differential   • CBC & Differential     Order Specific Question:   Manual Differential     Answer:   No      Cervical radiculopathy; doing well with current management, no adverse effects medication, Sonido reviewed and appropriate, no signs abuse, misuse.     Low BMI; continue to stress/encourage need for increased p.o. intake.  Denied food insecurity, abdominal pain, nausea, vomiting, diarrhea.  Stressed importance of meal supplements.  Patient voiced understanding, agreeable to plan.    Easy bruising; denies easy bleeding, vitals stable, reassuring.  Will obtain labs above, treat as indicated.      This document has been electronically signed by Pk Barton MD on March 13, 2023 14:37 CDT    EMR Dragon/Transciption Disclaimer: Some of this note may be an electronic transcription/translation of spoken language to printed text.  The electronic translation of spoken language may permit erroneous, or at times, nonsensical words or phrases to be inadvertently transcribed. Although I have reviewed the note for such errors, some may still exist.

## 2023-03-14 LAB
25(OH)D3 SERPL-MCNC: <6 NG/ML (ref 30–100)
ALBUMIN SERPL-MCNC: 4.3 G/DL (ref 3.5–5.2)
ALBUMIN/GLOB SERPL: 2 G/DL
ALP SERPL-CCNC: 62 U/L (ref 39–117)
ALT SERPL W P-5'-P-CCNC: 8 U/L (ref 1–33)
AMYLASE SERPL-CCNC: 71 U/L (ref 28–100)
ANION GAP SERPL CALCULATED.3IONS-SCNC: 15 MMOL/L (ref 5–15)
AST SERPL-CCNC: 19 U/L (ref 1–32)
BASOPHILS # BLD AUTO: 0.06 10*3/MM3 (ref 0–0.2)
BASOPHILS NFR BLD AUTO: 1 % (ref 0–1.5)
BILIRUB SERPL-MCNC: 0.3 MG/DL (ref 0–1.2)
BUN SERPL-MCNC: 12 MG/DL (ref 8–23)
BUN/CREAT SERPL: 18.8 (ref 7–25)
CALCIUM SPEC-SCNC: 8.8 MG/DL (ref 8.6–10.5)
CHLORIDE SERPL-SCNC: 99 MMOL/L (ref 98–107)
CHOLEST SERPL-MCNC: 187 MG/DL (ref 0–200)
CO2 SERPL-SCNC: 27 MMOL/L (ref 22–29)
CREAT SERPL-MCNC: 0.64 MG/DL (ref 0.57–1)
DEPRECATED RDW RBC AUTO: 40.6 FL (ref 37–54)
EGFRCR SERPLBLD CKD-EPI 2021: 99.4 ML/MIN/1.73
EOSINOPHIL # BLD AUTO: 0.07 10*3/MM3 (ref 0–0.4)
EOSINOPHIL NFR BLD AUTO: 1.2 % (ref 0.3–6.2)
ERYTHROCYTE [DISTWIDTH] IN BLOOD BY AUTOMATED COUNT: 13.2 % (ref 12.3–15.4)
FOLATE SERPL-MCNC: 8.37 NG/ML (ref 4.78–24.2)
GLOBULIN UR ELPH-MCNC: 2.2 GM/DL
GLUCOSE SERPL-MCNC: 81 MG/DL (ref 65–99)
HCT VFR BLD AUTO: 39.3 % (ref 34–46.6)
HDLC SERPL-MCNC: 52 MG/DL (ref 40–60)
HGB BLD-MCNC: 13.2 G/DL (ref 12–15.9)
IMM GRANULOCYTES # BLD AUTO: 0.01 10*3/MM3 (ref 0–0.05)
IMM GRANULOCYTES NFR BLD AUTO: 0.2 % (ref 0–0.5)
INR PPP: 1.04 (ref 0.8–1.2)
LDLC SERPL CALC-MCNC: 108 MG/DL (ref 0–100)
LDLC/HDLC SERPL: 2 {RATIO}
LIPASE SERPL-CCNC: 26 U/L (ref 13–60)
LYMPHOCYTES # BLD AUTO: 1.85 10*3/MM3 (ref 0.7–3.1)
LYMPHOCYTES NFR BLD AUTO: 30.9 % (ref 19.6–45.3)
MCH RBC QN AUTO: 28.8 PG (ref 26.6–33)
MCHC RBC AUTO-ENTMCNC: 33.6 G/DL (ref 31.5–35.7)
MCV RBC AUTO: 85.8 FL (ref 79–97)
MONOCYTES # BLD AUTO: 0.43 10*3/MM3 (ref 0.1–0.9)
MONOCYTES NFR BLD AUTO: 7.2 % (ref 5–12)
NEUTROPHILS NFR BLD AUTO: 3.57 10*3/MM3 (ref 1.7–7)
NEUTROPHILS NFR BLD AUTO: 59.5 % (ref 42.7–76)
NRBC BLD AUTO-RTO: 0 /100 WBC (ref 0–0.2)
PLATELET # BLD AUTO: 234 10*3/MM3 (ref 140–450)
PMV BLD AUTO: 10.1 FL (ref 6–12)
POTASSIUM SERPL-SCNC: 3.4 MMOL/L (ref 3.5–5.2)
PROT SERPL-MCNC: 6.5 G/DL (ref 6–8.5)
PROTHROMBIN TIME: 13.5 SECONDS (ref 11.1–15.3)
RBC # BLD AUTO: 4.58 10*6/MM3 (ref 3.77–5.28)
SODIUM SERPL-SCNC: 141 MMOL/L (ref 136–145)
TRIGL SERPL-MCNC: 154 MG/DL (ref 0–150)
VIT B12 BLD-MCNC: 255 PG/ML (ref 211–946)
VLDLC SERPL-MCNC: 27 MG/DL (ref 5–40)
WBC NRBC COR # BLD: 5.99 10*3/MM3 (ref 3.4–10.8)

## 2023-03-15 LAB
H PYLORI IGA SER-ACNC: <9 UNITS (ref 0–8.9)
H PYLORI IGG SER IA-ACNC: 0.26 INDEX VALUE (ref 0–0.79)
HCV AB SERPL QL IA: NORMAL
HCV IGG SERPL QL IA: NON REACTIVE

## 2023-03-23 DIAGNOSIS — E55.9 VITAMIN D DEFICIENCY: Primary | ICD-10-CM

## 2023-03-23 DIAGNOSIS — E55.9 VITAMIN D DEFICIENCY: ICD-10-CM

## 2023-03-23 RX ORDER — ERGOCALCIFEROL 1.25 MG/1
CAPSULE ORAL
Qty: 12 CAPSULE | Refills: 0 | Status: SHIPPED | OUTPATIENT
Start: 2023-03-23

## 2023-03-23 RX ORDER — ERGOCALCIFEROL 1.25 MG/1
50000 CAPSULE ORAL WEEKLY
Qty: 5 CAPSULE | Refills: 1 | Status: SHIPPED | OUTPATIENT
Start: 2023-03-23 | End: 2023-03-23

## 2023-04-21 DIAGNOSIS — E55.9 VITAMIN D DEFICIENCY: ICD-10-CM

## 2023-04-21 RX ORDER — ERGOCALCIFEROL 1.25 MG/1
CAPSULE ORAL
Qty: 12 CAPSULE | Refills: 0 | Status: SHIPPED | OUTPATIENT
Start: 2023-04-21

## 2023-05-11 DIAGNOSIS — M54.12 CERVICAL RADICULOPATHY: ICD-10-CM

## 2023-05-12 RX ORDER — GABAPENTIN 300 MG/1
CAPSULE ORAL
Qty: 90 CAPSULE | Refills: 0 | OUTPATIENT
Start: 2023-05-12

## 2023-05-23 ENCOUNTER — TELEPHONE (OUTPATIENT)
Dept: FAMILY MEDICINE CLINIC | Facility: CLINIC | Age: 63
End: 2023-05-23
Payer: MEDICARE

## 2023-05-23 DIAGNOSIS — M54.12 CERVICAL RADICULOPATHY: ICD-10-CM

## 2023-05-23 NOTE — TELEPHONE ENCOUNTER
Patient states is moving and needs a refill on her medication and states was unaware of appointment that was cancelled last week.  Patient would like a return call

## 2023-05-24 NOTE — TELEPHONE ENCOUNTER
Pt is going out of town before moving at end of month. She took her last gabapentin yesterday, would like to know if could have a prescription sent to the pharmacy as she has a very bad headache and the gabapentin helps.

## 2023-05-25 RX ORDER — GABAPENTIN 300 MG/1
300 CAPSULE ORAL 3 TIMES DAILY
Qty: 90 CAPSULE | Refills: 0 | Status: SHIPPED | OUTPATIENT
Start: 2023-05-25